# Patient Record
Sex: FEMALE | Race: WHITE | NOT HISPANIC OR LATINO | Employment: OTHER | URBAN - METROPOLITAN AREA
[De-identification: names, ages, dates, MRNs, and addresses within clinical notes are randomized per-mention and may not be internally consistent; named-entity substitution may affect disease eponyms.]

---

## 2017-01-23 ENCOUNTER — HISTORICAL (OUTPATIENT)
Dept: ENDOSCOPY | Facility: HOSPITAL | Age: 57
End: 2017-01-23

## 2017-09-25 ENCOUNTER — HISTORICAL (OUTPATIENT)
Dept: ADMINISTRATIVE | Facility: HOSPITAL | Age: 57
End: 2017-09-25

## 2017-11-21 ENCOUNTER — HOSPITAL ENCOUNTER (OUTPATIENT)
Dept: MEDSURG UNIT | Facility: HOSPITAL | Age: 57
End: 2017-11-22
Attending: INTERNAL MEDICINE | Admitting: INTERNAL MEDICINE

## 2017-11-21 LAB
ABS NEUT (OLG): 3.9 X10(3)/MCL (ref 2.1–9.2)
BASOPHILS # BLD AUTO: 0.1 X10(3)/MCL (ref 0–0.2)
BASOPHILS NFR BLD AUTO: 1 %
EOSINOPHIL # BLD AUTO: 0.4 X10(3)/MCL (ref 0–0.9)
EOSINOPHIL NFR BLD AUTO: 6 %
ERYTHROCYTE [DISTWIDTH] IN BLOOD BY AUTOMATED COUNT: 13.5 % (ref 11.5–17)
HCT VFR BLD AUTO: 33.4 % (ref 37–47)
HGB BLD-MCNC: 10.9 GM/DL (ref 12–16)
LYMPHOCYTES # BLD AUTO: 1.3 X10(3)/MCL (ref 0.6–4.6)
LYMPHOCYTES NFR BLD AUTO: 22 %
MCH RBC QN AUTO: 32.2 PG (ref 27–31)
MCHC RBC AUTO-ENTMCNC: 32.6 GM/DL (ref 33–36)
MCV RBC AUTO: 98.8 FL (ref 80–94)
MONOCYTES # BLD AUTO: 0.3 X10(3)/MCL (ref 0.1–1.3)
MONOCYTES NFR BLD AUTO: 5 %
NEUTROPHILS # BLD AUTO: 3.9 X10(3)/MCL (ref 1.4–7.9)
NEUTROPHILS NFR BLD AUTO: 65 %
PLATELET # BLD AUTO: 227 X10(3)/MCL (ref 130–400)
PMV BLD AUTO: 9.6 FL (ref 9.4–12.4)
RBC # BLD AUTO: 3.38 X10(6)/MCL (ref 4.2–5.4)
WBC # SPEC AUTO: 6 X10(3)/MCL (ref 4.5–11.5)

## 2017-11-22 LAB
ERYTHROCYTE [DISTWIDTH] IN BLOOD BY AUTOMATED COUNT: 13.4 % (ref 11.5–17)
HCT VFR BLD AUTO: 32.4 % (ref 37–47)
HGB BLD-MCNC: 10.4 GM/DL (ref 12–16)
MCH RBC QN AUTO: 31.7 PG (ref 27–31)
MCHC RBC AUTO-ENTMCNC: 32.1 GM/DL (ref 33–36)
MCV RBC AUTO: 98.8 FL (ref 80–94)
PLATELET # BLD AUTO: 234 X10(3)/MCL (ref 130–400)
PMV BLD AUTO: 9.8 FL (ref 9.4–12.4)
RBC # BLD AUTO: 3.28 X10(6)/MCL (ref 4.2–5.4)
WBC # SPEC AUTO: 6.4 X10(3)/MCL (ref 4.5–11.5)

## 2018-01-22 ENCOUNTER — HISTORICAL (OUTPATIENT)
Dept: ADMINISTRATIVE | Facility: HOSPITAL | Age: 58
End: 2018-01-22

## 2018-03-08 ENCOUNTER — HISTORICAL (OUTPATIENT)
Dept: RADIOLOGY | Facility: HOSPITAL | Age: 58
End: 2018-03-08

## 2018-04-16 ENCOUNTER — HISTORICAL (OUTPATIENT)
Dept: ADMINISTRATIVE | Facility: HOSPITAL | Age: 58
End: 2018-04-16

## 2018-04-27 DIAGNOSIS — I65.23 CAROTID STENOSIS, BILATERAL: Primary | ICD-10-CM

## 2018-05-07 DIAGNOSIS — I65.23 CAROTID STENOSIS, BILATERAL: Primary | ICD-10-CM

## 2018-05-15 DIAGNOSIS — I65.23 CAROTID STENOSIS, BILATERAL: Primary | ICD-10-CM

## 2018-05-16 ENCOUNTER — HOSPITAL ENCOUNTER (INPATIENT)
Facility: HOSPITAL | Age: 58
LOS: 1 days | Discharge: HOME | DRG: 641 | End: 2018-05-17
Attending: EMERGENCY MEDICINE | Admitting: INTERNAL MEDICINE
Payer: COMMERCIAL

## 2018-05-16 ENCOUNTER — SURGERY (OUTPATIENT)
Age: 58
End: 2018-05-16

## 2018-05-16 DIAGNOSIS — K31.84 GASTROPARESIS: ICD-10-CM

## 2018-05-16 DIAGNOSIS — E87.6 ACUTE HYPOKALEMIA: Primary | ICD-10-CM

## 2018-05-16 DIAGNOSIS — E83.42 HYPOMAGNESEMIA: ICD-10-CM

## 2018-05-16 DIAGNOSIS — E87.6 CHRONIC HYPOKALEMIA: ICD-10-CM

## 2018-05-16 DIAGNOSIS — I10 ESSENTIAL HYPERTENSION: ICD-10-CM

## 2018-05-16 DIAGNOSIS — E87.8 ELECTROLYTE IMBALANCE: ICD-10-CM

## 2018-05-16 DIAGNOSIS — D64.9 ANEMIA, UNSPECIFIED TYPE: ICD-10-CM

## 2018-05-16 PROBLEM — Z86.73 HISTORY OF STROKE: Status: ACTIVE | Noted: 2018-05-16

## 2018-05-16 PROBLEM — I65.23 CAROTID STENOSIS, BILATERAL: Status: ACTIVE | Noted: 2018-05-16

## 2018-05-16 PROBLEM — G43.909 MIGRAINES: Status: ACTIVE | Noted: 2018-05-16

## 2018-05-16 LAB — MAGNESIUM SERPL-MCNC: 1.3 MG/DL

## 2018-05-16 PROCEDURE — 25000003 PHARM REV CODE 250: Performed by: EMERGENCY MEDICINE

## 2018-05-16 PROCEDURE — 96367 TX/PROPH/DG ADDL SEQ IV INF: CPT

## 2018-05-16 PROCEDURE — 99291 CRITICAL CARE FIRST HOUR: CPT | Mod: ,,, | Performed by: EMERGENCY MEDICINE

## 2018-05-16 PROCEDURE — 99285 EMERGENCY DEPT VISIT HI MDM: CPT | Mod: 25

## 2018-05-16 PROCEDURE — 96365 THER/PROPH/DIAG IV INF INIT: CPT

## 2018-05-16 PROCEDURE — 63600175 PHARM REV CODE 636 W HCPCS: Performed by: EMERGENCY MEDICINE

## 2018-05-16 PROCEDURE — 94761 N-INVAS EAR/PLS OXIMETRY MLT: CPT

## 2018-05-16 PROCEDURE — 25000003 PHARM REV CODE 250: Performed by: INTERNAL MEDICINE

## 2018-05-16 PROCEDURE — 63600175 PHARM REV CODE 636 W HCPCS: Performed by: INTERNAL MEDICINE

## 2018-05-16 PROCEDURE — 99222 1ST HOSP IP/OBS MODERATE 55: CPT | Mod: ,,, | Performed by: INTERNAL MEDICINE

## 2018-05-16 PROCEDURE — 20600001 HC STEP DOWN PRIVATE ROOM

## 2018-05-16 PROCEDURE — 96366 THER/PROPH/DIAG IV INF ADDON: CPT

## 2018-05-16 PROCEDURE — 83735 ASSAY OF MAGNESIUM: CPT

## 2018-05-16 RX ORDER — POTASSIUM CHLORIDE 7.45 MG/ML
10 INJECTION INTRAVENOUS
Status: COMPLETED | OUTPATIENT
Start: 2018-05-16 | End: 2018-05-16

## 2018-05-16 RX ORDER — CARVEDILOL 6.25 MG/1
25 TABLET ORAL 2 TIMES DAILY
Status: DISCONTINUED | OUTPATIENT
Start: 2018-05-16 | End: 2018-05-17 | Stop reason: HOSPADM

## 2018-05-16 RX ORDER — ASPIRIN 325 MG
325 TABLET ORAL DAILY
Status: DISCONTINUED | OUTPATIENT
Start: 2018-05-18 | End: 2018-05-17 | Stop reason: HOSPADM

## 2018-05-16 RX ORDER — ACETAMINOPHEN 500 MG
500 TABLET ORAL EVERY 6 HOURS PRN
Status: DISCONTINUED | OUTPATIENT
Start: 2018-05-16 | End: 2018-05-17 | Stop reason: HOSPADM

## 2018-05-16 RX ORDER — SODIUM CHLORIDE 0.9 % (FLUSH) 0.9 %
5 SYRINGE (ML) INJECTION
Status: DISCONTINUED | OUTPATIENT
Start: 2018-05-16 | End: 2018-05-17 | Stop reason: HOSPADM

## 2018-05-16 RX ORDER — LANOLIN ALCOHOL/MO/W.PET/CERES
400 CREAM (GRAM) TOPICAL 2 TIMES DAILY
Status: DISCONTINUED | OUTPATIENT
Start: 2018-05-16 | End: 2018-05-17 | Stop reason: HOSPADM

## 2018-05-16 RX ORDER — ASPIRIN 325 MG
325 TABLET ORAL DAILY
Status: DISCONTINUED | OUTPATIENT
Start: 2018-05-17 | End: 2018-05-16

## 2018-05-16 RX ORDER — ATORVASTATIN CALCIUM 20 MG/1
20 TABLET, FILM COATED ORAL DAILY
Status: DISCONTINUED | OUTPATIENT
Start: 2018-05-17 | End: 2018-05-17 | Stop reason: HOSPADM

## 2018-05-16 RX ORDER — AMLODIPINE BESYLATE 5 MG/1
5 TABLET ORAL DAILY
Status: DISCONTINUED | OUTPATIENT
Start: 2018-05-17 | End: 2018-05-17 | Stop reason: HOSPADM

## 2018-05-16 RX ORDER — ONDANSETRON 8 MG/1
8 TABLET, ORALLY DISINTEGRATING ORAL EVERY 8 HOURS PRN
Status: DISCONTINUED | OUTPATIENT
Start: 2018-05-16 | End: 2018-05-17 | Stop reason: HOSPADM

## 2018-05-16 RX ORDER — CLOPIDOGREL BISULFATE 75 MG/1
75 TABLET ORAL DAILY
Status: DISCONTINUED | OUTPATIENT
Start: 2018-05-17 | End: 2018-05-16

## 2018-05-16 RX ORDER — POTASSIUM CHLORIDE 20 MEQ/15ML
40 SOLUTION ORAL
Status: COMPLETED | OUTPATIENT
Start: 2018-05-16 | End: 2018-05-16

## 2018-05-16 RX ORDER — MAGNESIUM SULFATE HEPTAHYDRATE 40 MG/ML
2 INJECTION, SOLUTION INTRAVENOUS ONCE
Status: COMPLETED | OUTPATIENT
Start: 2018-05-16 | End: 2018-05-17

## 2018-05-16 RX ORDER — POTASSIUM CHLORIDE 20 MEQ/1
40 TABLET, EXTENDED RELEASE ORAL
Status: COMPLETED | OUTPATIENT
Start: 2018-05-16 | End: 2018-05-16

## 2018-05-16 RX ORDER — SUMATRIPTAN SUCCINATE 100 MG/1
100 TABLET ORAL
COMMUNITY

## 2018-05-16 RX ORDER — CLOPIDOGREL BISULFATE 75 MG/1
75 TABLET ORAL DAILY
Status: DISCONTINUED | OUTPATIENT
Start: 2018-05-18 | End: 2018-05-17 | Stop reason: HOSPADM

## 2018-05-16 RX ORDER — ALPRAZOLAM 0.25 MG/1
0.5 TABLET ORAL 2 TIMES DAILY PRN
Status: DISCONTINUED | OUTPATIENT
Start: 2018-05-16 | End: 2018-05-17 | Stop reason: HOSPADM

## 2018-05-16 RX ORDER — MAGNESIUM SULFATE HEPTAHYDRATE 40 MG/ML
2 INJECTION, SOLUTION INTRAVENOUS
Status: DISPENSED | OUTPATIENT
Start: 2018-05-16 | End: 2018-05-16

## 2018-05-16 RX ORDER — TRAMADOL HYDROCHLORIDE 50 MG/1
50 TABLET ORAL ONCE AS NEEDED
Status: COMPLETED | OUTPATIENT
Start: 2018-05-17 | End: 2018-05-17

## 2018-05-16 RX ORDER — MAGNESIUM SULFATE HEPTAHYDRATE 40 MG/ML
2 INJECTION, SOLUTION INTRAVENOUS
Status: COMPLETED | OUTPATIENT
Start: 2018-05-16 | End: 2018-05-16

## 2018-05-16 RX ADMIN — MAGNESIUM SULFATE IN WATER 2 G: 40 INJECTION, SOLUTION INTRAVENOUS at 09:05

## 2018-05-16 RX ADMIN — POTASSIUM CHLORIDE 40 MEQ: 1500 TABLET, EXTENDED RELEASE ORAL at 09:05

## 2018-05-16 RX ADMIN — Medication 400 MG: at 11:05

## 2018-05-16 RX ADMIN — POTASSIUM CHLORIDE 40 MEQ: 20 SOLUTION ORAL at 01:05

## 2018-05-16 RX ADMIN — POTASSIUM CHLORIDE 40 MEQ: 1500 TABLET, EXTENDED RELEASE ORAL at 11:05

## 2018-05-16 RX ADMIN — MAGNESIUM SULFATE IN WATER 2 G: 40 INJECTION, SOLUTION INTRAVENOUS at 02:05

## 2018-05-16 RX ADMIN — MAGNESIUM SULFATE IN WATER 2 G: 40 INJECTION, SOLUTION INTRAVENOUS at 11:05

## 2018-05-16 RX ADMIN — POTASSIUM CHLORIDE 10 MEQ: 7.46 INJECTION, SOLUTION INTRAVENOUS at 05:05

## 2018-05-16 RX ADMIN — POTASSIUM CHLORIDE 40 MEQ: 1500 TABLET, EXTENDED RELEASE ORAL at 06:05

## 2018-05-16 NOTE — ED PROVIDER NOTES
Encounter Date: 5/16/2018    SCRIBE #1 NOTE: I, Gogo Oliveira, am scribing for, and in the presence of,  Dr. Estrada. I have scribed the entire note.       History     Chief Complaint   Patient presents with    Abnormal Lab     Pt sent down from [facility] for hypokalemia 2.2.  Pt was scheduled to have cerebral angiogram today.      Time patient was seen by the provider: 12:48 PM      The patient is a 57 y.o. female with co-morbidities including: stroke and  HTN who was sent to the ED with a complaint of abnormal labs. The patient was sent from [facility] for hypokalemia (2.2). The patient was scheduled to have an angiogram today. Patient denies any abdominal pain.       The history is provided by the patient and medical records.     Review of patient's allergies indicates:   Allergen Reactions    Morphine Anaphylaxis    Penicillins Shortness Of Breath and Swelling     Past Medical History:   Diagnosis Date    Encounter for blood transfusion     Hypertension     Migraine headache     PONV (postoperative nausea and vomiting)     Stroke     Transfusion reaction      Past Surgical History:   Procedure Laterality Date    APPENDECTOMY      CARDIAC SURGERY      HERNIA REPAIR      HYSTERECTOMY      JOINT REPLACEMENT      VASCULAR SURGERY       History reviewed. No pertinent family history.  Social History   Substance Use Topics    Smoking status: Never Smoker    Smokeless tobacco: Not on file    Alcohol use No     Review of Systems   Constitutional: Negative for activity change and fever.   HENT: Negative for congestion and sore throat.    Eyes: Negative for visual disturbance.   Respiratory: Negative for chest tightness and shortness of breath.    Cardiovascular: Negative for chest pain.   Gastrointestinal: Negative for abdominal distention, abdominal pain, constipation and nausea.   Genitourinary: Negative for difficulty urinating, dysuria, frequency and urgency.   Musculoskeletal: Negative for back pain.   Skin:  Negative for rash.   Neurological: Negative for dizziness, weakness, numbness and headaches.   Hematological: Does not bruise/bleed easily.   Psychiatric/Behavioral: Negative for agitation, behavioral problems, confusion and decreased concentration.   All other systems reviewed and are negative.      Physical Exam     Initial Vitals [05/16/18 1214]   BP Pulse Resp Temp SpO2   (!) 162/78 (!) 59 16 97.6 °F (36.4 °C) 99 %      MAP       106         Physical Exam    Nursing note and vitals reviewed.  Constitutional: She appears well-developed and well-nourished. No distress.   HENT:   Head: Normocephalic and atraumatic.   Nose: Nose normal.   Mouth/Throat: Oropharynx is clear and moist.   Eyes: Conjunctivae and EOM are normal. Pupils are equal, round, and reactive to light.   Neck: Normal range of motion. Neck supple. No JVD present.   Cardiovascular: Normal rate, regular rhythm, normal heart sounds and intact distal pulses.   Pulmonary/Chest: Breath sounds normal. No respiratory distress. She has no wheezes. She has no rhonchi. She has no rales.   Abdominal: Soft. Bowel sounds are normal. She exhibits no distension. There is no tenderness. There is no rebound and no guarding.   Musculoskeletal: Normal range of motion. She exhibits no edema or tenderness.   Neurological: She is alert and oriented to person, place, and time. She has normal strength. No cranial nerve deficit or sensory deficit.   Skin: Skin is warm and dry. Capillary refill takes less than 2 seconds.   Psychiatric: She has a normal mood and affect. Her behavior is normal. Judgment and thought content normal.         ED Course   Procedures  Labs Reviewed   MAGNESIUM - Abnormal; Notable for the following:        Result Value    Magnesium 1.3 (*)     All other components within normal limits             Medical Decision Making:   History:   Old Medical Records: I decided to obtain old medical records.  Differential Diagnosis:   Hypokalemia.  Electrolyte  Imbalance.  Clinical Tests:   Lab Tests: Ordered  Other:   I have discussed this case with another health care provider.       <> Summary of the Discussion: Patient will be admitted to the hospital by Dr Leighton Charles for further evaluation and management.            Scribe Attestation:   Scribe #1: I performed the above scribed service and the documentation accurately describes the services I performed. I attest to the accuracy of the note.    Attending Attestation:         Attending Critical Care:   Critical Care Times:   Direct Patient Care (initial evaluation, reassessments, and time considering the case)................................................................10 minutes.   Additional History from reviewing old medical records or taking additional history from the family, EMS, PCP, etc.......................5 minutes.   Ordering, Reviewing, and Interpreting Diagnostic Studies...............................................................................................................10 minutes.   Documentation..................................................................................................................................................................................10 minutes.   Consultation with other Physicians. .................................................................................................................................................10 minutes.   ==============================================================  · Total Critical Care Time - exclusive of procedural time: 45 minutes.  ==============================================================  Critical care was necessary to treat or prevent imminent or life-threatening deterioration of the following conditions: metabolic crisis.   Critical care was time spent personally by me on the following activities: obtaining history from patient or relative, examination of patient, ordering lab, x-rays, and/or EKG,  development of treatment plan with patient or relative, ordering and performing treatments and interventions, evaluation of patient's response to treatment, discussion with consultants and re-evaluation of patient's conition.   Critical Care Condition: potentially life-threatening     Physician Attestation for Dori:      Comments: I, Dr. Estrada, personally performed the services described in this documentation. All medical record entries made by the scribe were at my direction and in my presence.  I have reviewed the chart and agree that the record reflects my personal performance and is accurate and complete.                 Clinical Impression:   The primary encounter diagnosis was Acute hypokalemia. Diagnoses of Hypomagnesemia and Electrolyte imbalance were also pertinent to this visit.    Disposition:   Disposition: Admitted  Condition: Stable                        Shannan Estrada MD  05/16/18 1927

## 2018-05-16 NOTE — ED NOTES
Pt presents with hypokalemia of 2.2. Pt reports migraine, muscle aches, CP and palpitations. Pt also states she had a large bruise to left groin and thought it was a blood clot but had a u/s on Friday and it was negative. Pt recently received IV K and was supposed to have a cerebral angiogram today.

## 2018-05-16 NOTE — H&P
"Hospital Medicine  History and Physical Exam    Patient Name; Radha Huang  MRN: [id number]  [place] [facility] [place] [facility] [place] Kandi Charles MD  Admit Date: 5/16/2018    TANNER: 5/18/2018  Principal Problem:  Chronic hypokalemia   Patient information was obtained from patient, past medical records and ER records.   Primary care Physician: Primary Doctor No  Code status: Full Code    HPI: 57 y.o. female presented to the ER from [facility] area with past medical history of possible carotid artery stenosis, "mini-strokes," hypertension, and anemia.  She was in her usual state of fair health until the recurrent onset of severe hypokalemia beginning a few weeks prior to admission with gradually worsening course. Pertinent associated symptoms include weakness and fatigue. She has had several episodes of hypokalemia as OP requiring IV repletion over the past few weeks. She is not on chronic K+ or Mg++ supplementation.    Past Medical History: Patient has a past medical history of Encounter for blood transfusion; Hypertension; Migraine headache; PONV (postoperative nausea and vomiting); Stroke; and Transfusion reaction.    Past Surgical History: Patient has a past surgical history that includes Hysterectomy; Hernia repair; Cardiac surgery; Vascular surgery; Joint replacement; and Appendectomy.    Social History: Patient reports that she has never smoked. She does not have any smokeless tobacco history on file. She reports that she does not drink alcohol or use drugs.    Family History: family history includes Hypokalemia in her mother.    Medications:   Current Facility-Administered Medications on File Prior to Encounter   Medication Dose Route Frequency Provider Last Rate Last Dose    [COMPLETED] 0.9%  NaCl infusion  500 mL Intravenous Once Laura Myers NP   Stopped at 05/16/18 1158    [DISCONTINUED] potassium chloride 10 mEq in 100 mL IVPB  10 mEq Intravenous Once Miguel Soriano MD        [DISCONTINUED] potassium " chloride 10 mEq in 100 mL IVPB  10 mEq Intravenous Q1H Miguel Soriano MD        [DISCONTINUED] potassium chloride CR capsule 10 mEq  10 mEq Oral Once Miguel Soriano MD         Current Outpatient Prescriptions on File Prior to Encounter   Medication Sig Dispense Refill    ALPRAZolam (XANAX) 0.5 MG tablet Take 0.5 mg by mouth 2 (two) times daily as needed for Anxiety.      amLODIPine (NORVASC) 5 MG tablet Take 5 mg by mouth once daily.      aspirin 325 MG tablet Take 325 mg by mouth once daily.      atorvastatin (LIPITOR) 20 MG tablet Take 20 mg by mouth once daily.      carvedilol (COREG) 25 MG tablet Take 25 mg by mouth 2 (two) times daily.      clopidogrel (PLAVIX) 75 mg tablet Take 75 mg by mouth once daily.      nitroGLYCERIN (NITROSTAT) 0.3 MG SL tablet Place 0.3 mg under the tongue every 5 (five) minutes as needed for Chest pain.      oxyCODONE-acetaminophen (PERCOCET)  mg per tablet Take 1 tablet by mouth every 4 (four) hours as needed for Pain.      [DISCONTINUED] potassium chloride (MICRO-K) 10 MEQ CpSR Take 10 mEq by mouth once.         Allergies: Patient is allergic to morphine and penicillins.    Review of Systems   Constitutional: Positive for malaise/fatigue. Negative for fever.   HENT: Negative.    Eyes: Negative.    Respiratory: Negative.    Cardiovascular: Negative.    Gastrointestinal: Negative.    Genitourinary: Negative.    Musculoskeletal: Negative.    Skin: Negative.    Neurological: Negative.    Endo/Heme/Allergies: Bruises/bleeds easily.       Physical Exam:  Temp:  [97.6 °F (36.4 °C)-98.2 °F (36.8 °C)]   Pulse:  [59]   Resp:  [16]   BP: (162-174)/(78-83)   SpO2:  [99 %]   Body mass index is 19.79 kg/m².     Physical Exam   Constitutional:  Non-toxic appearance. No distress.   HENT:   Head: Normocephalic.   Mouth/Throat: Mucous membranes are not pale and not cyanotic.   Eyes: Conjunctivae and lids are normal. Pupils are equal.   Neck: Neck supple.   Cardiovascular:  Regular rhythm, S1 normal and S2 normal.    Pulmonary/Chest: Effort normal and breath sounds normal.   Abdominal: Soft. Normal appearance and bowel sounds are normal. There is no tenderness.   Musculoskeletal: She exhibits no edema.   Neurological: She is alert. She is not disoriented.   Skin: Skin is warm and dry. Bruising (L LE) noted. No cyanosis.   Psychiatric: Mood and affect normal.         Intake/Output Summary (Last 24 hours) at 05/16/18 1739  Last data filed at 05/16/18 1701   Gross per 24 hour   Intake               50 ml   Output                0 ml   Net               50 ml       Significant Labs and Imaging:      Recent Labs  Lab 05/16/18  0850   WBC 6.35   HGB 10.1*   HCT 30.3*          Recent Labs  Lab 05/16/18  0850 05/16/18  1035 05/16/18  1312    145  --    K 2.5* 2.2*  --    CL 92* 93*  --    CO2 42* 44*  --    BUN 13 13  --    CREATININE 1.1 1.2  --    GLU 89 87  --    CALCIUM 9.5 9.4  --    MG  --   --  1.3*   ALKPHOS 63  --   --    ALT 7*  --   --    AST 19  --   --    ALBUMIN 3.6  --   --    PROT 6.3  --   --    BILITOT 0.2  --   --    INR 1.1  --   --      Baselines:  Hemoglobin   Date Value Ref Range Status   05/16/2018 10.1 (L) 12.0 - 16.0 g/dL Final     Creatinine   Date Value Ref Range Status   05/16/2018 1.2 0.5 - 1.4 mg/dL Final   05/16/2018 1.1 0.5 - 1.4 mg/dL Final       Inpatient Medications prescribed for management of current Problems:   Scheduled Meds:    [START ON 5/17/2018] amLODIPine  5 mg Oral Daily    [START ON 5/18/2018] aspirin  325 mg Oral Daily    [START ON 5/17/2018] atorvastatin  20 mg Oral Daily    carvedilol  25 mg Oral BID    [START ON 5/18/2018] clopidogrel  75 mg Oral Daily    magnesium oxide  400 mg Oral BID    magnesium sulfate IVPB  2 g Intravenous Q2H    potassium chloride  40 mEq Oral Q2H     Continuous Infusions:   As Needed: acetaminophen, ALPRAZolam, ondansetron, sodium chloride 0.9%    Active Hospital Problems    Diagnosis  POA     "*Chronic hypokalemia [E87.6]  Yes    Carotid stenosis, bilateral [I65.23]  Yes    Essential hypertension [I10]  Yes    History of stroke [Z86.73]  Not Applicable    Migraines [G43.909]  Yes    Anemia [D64.9]  Yes      Resolved Hospital Problems    Diagnosis Date Resolved POA   No resolved problems to display.       Overview:  Patient is a 57 y.o. female with significant past medical history of "mini-strokes" awaiting cerebral angiogram admitted to hospital for hypokalemia.      Assessment and Plan for Problems addressed today:     Acute on Chronic Hypokalemia and Hypomagnesemia  Repletion PO and IV.     Carotid stenosis, bilateral, history of strokes  Here for cerebral angiogram. Consulting IR to reschedule for 05/17/18. NPO after MN.  Holding ASA and Plavix.     Essential hypertension  Continuing home medications.     Migraines  Currently stable.     Anemia  Unclear etiology; sees non-[facility] Heme/Onc. Reports multiple transfusions and antibody formation.      DVT Prophylaxis:   Anticoagulants   Medication Route Frequency       Discharge plan and follow up  Home or Self Care      Provider  Kandi Charles MD  Department of Hospital Medicine      "

## 2018-05-17 VITALS
HEIGHT: 59 IN | OXYGEN SATURATION: 96 % | RESPIRATION RATE: 16 BRPM | TEMPERATURE: 98 F | BODY MASS INDEX: 20.8 KG/M2 | SYSTOLIC BLOOD PRESSURE: 131 MMHG | WEIGHT: 103.19 LBS | DIASTOLIC BLOOD PRESSURE: 77 MMHG | HEART RATE: 60 BPM

## 2018-05-17 PROBLEM — K31.84 GASTROPARESIS: Status: ACTIVE | Noted: 2018-05-17

## 2018-05-17 LAB
ALBUMIN SERPL BCP-MCNC: 3.2 G/DL
ANION GAP SERPL CALC-SCNC: 3 MMOL/L
BASOPHILS # BLD AUTO: 0.05 K/UL
BASOPHILS NFR BLD: 0.8 %
BUN SERPL-MCNC: 11 MG/DL
CALCIUM SERPL-MCNC: 7.4 MG/DL
CHLORIDE SERPL-SCNC: 107 MMOL/L
CO2 SERPL-SCNC: 30 MMOL/L
CREAT SERPL-MCNC: 1 MG/DL
DIFFERENTIAL METHOD: ABNORMAL
EOSINOPHIL # BLD AUTO: 0.7 K/UL
EOSINOPHIL NFR BLD: 10.4 %
ERYTHROCYTE [DISTWIDTH] IN BLOOD BY AUTOMATED COUNT: 13.5 %
EST. GFR  (AFRICAN AMERICAN): >60 ML/MIN/1.73 M^2
EST. GFR  (NON AFRICAN AMERICAN): >60 ML/MIN/1.73 M^2
GLUCOSE SERPL-MCNC: 96 MG/DL
HCT VFR BLD AUTO: 29.2 %
HGB BLD-MCNC: 9.5 G/DL
IMM GRANULOCYTES # BLD AUTO: 0.01 K/UL
IMM GRANULOCYTES NFR BLD AUTO: 0.2 %
LYMPHOCYTES # BLD AUTO: 1.8 K/UL
LYMPHOCYTES NFR BLD: 28.6 %
MAGNESIUM SERPL-MCNC: 2.9 MG/DL
MCH RBC QN AUTO: 32.2 PG
MCHC RBC AUTO-ENTMCNC: 32.5 G/DL
MCV RBC AUTO: 99 FL
MONOCYTES # BLD AUTO: 0.4 K/UL
MONOCYTES NFR BLD: 6.1 %
NEUTROPHILS # BLD AUTO: 3.4 K/UL
NEUTROPHILS NFR BLD: 53.9 %
NRBC BLD-RTO: 0 /100 WBC
PHOSPHATE SERPL-MCNC: 1.5 MG/DL
PLATELET # BLD AUTO: 192 K/UL
PMV BLD AUTO: 10.9 FL
POTASSIUM SERPL-SCNC: 4 MMOL/L
RBC # BLD AUTO: 2.95 M/UL
SODIUM SERPL-SCNC: 140 MMOL/L
WBC # BLD AUTO: 6.36 K/UL

## 2018-05-17 PROCEDURE — B31CYZZ FLUOROSCOPY OF BILATERAL EXTERNAL CAROTID ARTERIES USING OTHER CONTRAST: ICD-10-PCS | Performed by: RADIOLOGY

## 2018-05-17 PROCEDURE — B31GYZZ FLUOROSCOPY OF BILATERAL VERTEBRAL ARTERIES USING OTHER CONTRAST: ICD-10-PCS | Performed by: RADIOLOGY

## 2018-05-17 PROCEDURE — 63600175 PHARM REV CODE 636 W HCPCS: Performed by: RADIOLOGY

## 2018-05-17 PROCEDURE — 36415 COLL VENOUS BLD VENIPUNCTURE: CPT

## 2018-05-17 PROCEDURE — 25500020 PHARM REV CODE 255: Performed by: INTERNAL MEDICINE

## 2018-05-17 PROCEDURE — 99239 HOSP IP/OBS DSCHRG MGMT >30: CPT | Mod: ,,, | Performed by: INTERNAL MEDICINE

## 2018-05-17 PROCEDURE — 80069 RENAL FUNCTION PANEL: CPT

## 2018-05-17 PROCEDURE — 25000003 PHARM REV CODE 250: Performed by: PHYSICIAN ASSISTANT

## 2018-05-17 PROCEDURE — 25000003 PHARM REV CODE 250: Performed by: INTERNAL MEDICINE

## 2018-05-17 PROCEDURE — B315YZZ FLUOROSCOPY OF BILATERAL COMMON CAROTID ARTERIES USING OTHER CONTRAST: ICD-10-PCS | Performed by: RADIOLOGY

## 2018-05-17 PROCEDURE — 83735 ASSAY OF MAGNESIUM: CPT

## 2018-05-17 PROCEDURE — 85025 COMPLETE CBC W/AUTO DIFF WBC: CPT

## 2018-05-17 PROCEDURE — B318YZZ FLUOROSCOPY OF BILATERAL INTERNAL CAROTID ARTERIES USING OTHER CONTRAST: ICD-10-PCS | Performed by: RADIOLOGY

## 2018-05-17 RX ORDER — MIDAZOLAM HYDROCHLORIDE 1 MG/ML
INJECTION INTRAMUSCULAR; INTRAVENOUS CODE/TRAUMA/SEDATION MEDICATION
Status: COMPLETED | OUTPATIENT
Start: 2018-05-17 | End: 2018-05-17

## 2018-05-17 RX ORDER — IODIXANOL 320 MG/ML
200 INJECTION, SOLUTION INTRAVASCULAR
Status: COMPLETED | OUTPATIENT
Start: 2018-05-17 | End: 2018-05-17

## 2018-05-17 RX ORDER — LANOLIN ALCOHOL/MO/W.PET/CERES
400 CREAM (GRAM) TOPICAL 2 TIMES DAILY
Refills: 0 | COMMUNITY
Start: 2018-05-17

## 2018-05-17 RX ORDER — BUTALBITAL, ACETAMINOPHEN AND CAFFEINE 50; 325; 40 MG/1; MG/1; MG/1
1 TABLET ORAL EVERY 4 HOURS PRN
Status: DISCONTINUED | OUTPATIENT
Start: 2018-05-17 | End: 2018-05-17 | Stop reason: HOSPADM

## 2018-05-17 RX ORDER — OXYCODONE AND ACETAMINOPHEN 10; 325 MG/1; MG/1
1 TABLET ORAL EVERY 6 HOURS PRN
Status: DISCONTINUED | OUTPATIENT
Start: 2018-05-17 | End: 2018-05-17 | Stop reason: HOSPADM

## 2018-05-17 RX ORDER — FENTANYL CITRATE 50 UG/ML
INJECTION, SOLUTION INTRAMUSCULAR; INTRAVENOUS CODE/TRAUMA/SEDATION MEDICATION
Status: COMPLETED | OUTPATIENT
Start: 2018-05-17 | End: 2018-05-17

## 2018-05-17 RX ORDER — POTASSIUM CHLORIDE 750 MG/1
10 CAPSULE, EXTENDED RELEASE ORAL DAILY
Qty: 30 CAPSULE | Refills: 1 | COMMUNITY
Start: 2018-05-17

## 2018-05-17 RX ADMIN — ATORVASTATIN CALCIUM 20 MG: 20 TABLET, FILM COATED ORAL at 08:05

## 2018-05-17 RX ADMIN — IODIXANOL 60 ML: 320 INJECTION, SOLUTION INTRAVASCULAR at 03:05

## 2018-05-17 RX ADMIN — MIDAZOLAM HYDROCHLORIDE 1 MG: 1 INJECTION, SOLUTION INTRAMUSCULAR; INTRAVENOUS at 03:05

## 2018-05-17 RX ADMIN — TRAMADOL HYDROCHLORIDE 50 MG: 50 TABLET, FILM COATED ORAL at 12:05

## 2018-05-17 RX ADMIN — FENTANYL CITRATE 25 MCG: 50 INJECTION, SOLUTION INTRAMUSCULAR; INTRAVENOUS at 03:05

## 2018-05-17 RX ADMIN — DIBASIC SODIUM PHOSPHATE, MONOBASIC POTASSIUM PHOSPHATE AND MONOBASIC SODIUM PHOSPHATE 1 TABLET: 852; 155; 130 TABLET ORAL at 12:05

## 2018-05-17 RX ADMIN — Medication 400 MG: at 08:05

## 2018-05-17 RX ADMIN — OXYCODONE HYDROCHLORIDE AND ACETAMINOPHEN 1 TABLET: 10; 325 TABLET ORAL at 10:05

## 2018-05-17 RX ADMIN — ALPRAZOLAM 0.5 MG: 0.25 TABLET ORAL at 12:05

## 2018-05-17 RX ADMIN — DIBASIC SODIUM PHOSPHATE, MONOBASIC POTASSIUM PHOSPHATE AND MONOBASIC SODIUM PHOSPHATE 1 TABLET: 852; 155; 130 TABLET ORAL at 05:05

## 2018-05-17 RX ADMIN — ACETAMINOPHEN 500 MG: 500 TABLET ORAL at 09:05

## 2018-05-17 RX ADMIN — AMLODIPINE BESYLATE 5 MG: 5 TABLET ORAL at 08:05

## 2018-05-17 NOTE — H&P
Radiology Post-Procedure Note    Pre Op Diagnosis: carotid stenosis, TIA    Post Op Diagnosis: same    Procedure: Cerebral angiogram    Procedure performed by: Pop Ocampo MD    Written Informed Consent Obtained: Yes    Specimen Removed: NO    Estimated Blood Loss: Minimal    Procedure report:     A 5F sheath was placed into the right femoral artery and a 5F Samir catheter was advanced into the aortic arch.  The Bilateral ICA, CCA, and vertebral  arteries were subselected and angiography of the brain was performed after injection into each of these vessels.    Preliminary interpretation: no intracranial stenoses.  Please see Imaging report for full details.    A right femoral artery angiogram was performed, the sheath removed and hemostasis achieved using Exoseal.  No hematoma was present at the time of hemostasis.    The patient tolerated the procedure well.     Pop Ocampo MD  Attending Radiologist  Interventional Neuroradiology

## 2018-05-17 NOTE — ED NOTES
Upon departure patient was alert and oriented, respirations even and unlabored, skin dry and warm, and showed no signs or symptoms of acute distress.

## 2018-05-17 NOTE — NURSING
Discharge instructions, follow up appointments, medications reviewed with patient and pt. Spouse. Both verbalizes understanding. PIV removed with catheter tip intact. Wheelchair offered, pt. Left unit via foot with spouse.

## 2018-05-17 NOTE — PROGRESS NOTES
Pt arrived to [facility], bay 3. Report received from JAYLON Lundy. Dressing to groin dry and intact. Family at bedside.

## 2018-05-17 NOTE — PLAN OF CARE
Problem: Patient Care Overview  Goal: Plan of Care Review  Plan of care reviewed with patient and pt. Family. AAOX4, tele monitor on pt, jorge a 55-60. Complaints of migraine, prn tylenol and percocet given. No complaints of nausea. NPO diet maintained. Pt. Ambulates independently, voids in toilet. Call light within reach, will monitor.

## 2018-05-17 NOTE — PLAN OF CARE
Problem: Patient Care Overview  Goal: Plan of Care Review  Outcome: Ongoing (interventions implemented as appropriate)  Patient AAO x4, calm and cooperative. Pt arrived from ED for Hypokalemia.  Magnesium and Potassium replaced per MD orders.  Pt NPO midnight for IR angiogram of carotid. No fall and injuries overnight. SCD's placed for VTE prophylaxis. Pt complaining of pain in neck and lower extremities. Tramadol administered for pain. No relief obtained. Call light within reach. Patient stable will continue to monitor.

## 2018-05-17 NOTE — H&P
Inpatient Radiology Pre-procedure Note    History of Present Illness:  Radha Huang is a 57 y.o. female who presents for cerebral angiogram to evaluate bilateral intracranial ICA stenoses on outside MRA.  Procedure was scheduled for yesterday, but she had severe hypokalemia that was treated overnight.    Admission H&P reviewed.  Past Medical History:   Diagnosis Date    Encounter for blood transfusion     Hypertension     Migraine headache     PONV (postoperative nausea and vomiting)     Stroke     Transfusion reaction      Past Surgical History:   Procedure Laterality Date    APPENDECTOMY      CARDIAC SURGERY      HERNIA REPAIR      HYSTERECTOMY      JOINT REPLACEMENT      VASCULAR SURGERY         Review of Systems:   As documented in primary team H&P    Home Meds:   Prior to Admission medications    Medication Sig Start Date End Date Taking? Authorizing Provider   ALPRAZolam (XANAX) 0.5 MG tablet Take 0.5 mg by mouth 2 (two) times daily as needed for Anxiety.   Yes Historical Provider, MD   amLODIPine (NORVASC) 5 MG tablet Take 5 mg by mouth once daily.   Yes Historical Provider, MD   aspirin 325 MG tablet Take 325 mg by mouth once daily.   Yes Historical Provider, MD   atorvastatin (LIPITOR) 20 MG tablet Take 20 mg by mouth once daily.   Yes Historical Provider, MD   carvedilol (COREG) 25 MG tablet Take 25 mg by mouth 2 (two) times daily.   Yes Historical Provider, MD   clopidogrel (PLAVIX) 75 mg tablet Take 75 mg by mouth once daily.   Yes Historical Provider, MD   nitroGLYCERIN (NITROSTAT) 0.3 MG SL tablet Place 0.3 mg under the tongue every 5 (five) minutes as needed for Chest pain.   Yes Historical Provider, MD   oxyCODONE-acetaminophen (PERCOCET)  mg per tablet Take 1 tablet by mouth every 4 (four) hours as needed for Pain.   Yes Historical Provider, MD   sumatriptan (IMITREX) 100 MG tablet Take 100 mg by mouth every 2 (two) hours as needed for Migraine.   Yes Historical Provider, MD      Scheduled Meds:    amLODIPine  5 mg Oral Daily    [START ON 5/18/2018] aspirin  325 mg Oral Daily    atorvastatin  20 mg Oral Daily    carvedilol  25 mg Oral BID    [START ON 5/18/2018] clopidogrel  75 mg Oral Daily    k phos di & mono-sod phos mono  1 tablet Oral TID WM    magnesium oxide  400 mg Oral BID     Continuous Infusions:   PRN Meds:acetaminophen, ALPRAZolam, ondansetron, sodium chloride 0.9%  Anticoagulants/Antiplatelets: no anticoagulation    Allergies:   Review of patient's allergies indicates:   Allergen Reactions    Morphine Anaphylaxis    Penicillins Shortness Of Breath and Swelling     Sedation Hx: have not been any systemic reactions    Labs:    Recent Labs  Lab 05/16/18 0850   INR 1.1       Recent Labs  Lab 05/17/18 0802   WBC 6.36   HGB 9.5*   HCT 29.2*   MCV 99*         Recent Labs  Lab 05/16/18 0850  05/17/18 0802   GLU 89  < > 96     < > 140   K 2.5*  < > 4.0   CL 92*  < > 107   CO2 42*  < > 30*   BUN 13  < > 11   CREATININE 1.1  < > 1.0   CALCIUM 9.5  < > 7.4*   MG  --   < > 2.9*   ALT 7*  --   --    AST 19  --   --    ALBUMIN 3.6  --  3.2*   BILITOT 0.2  --   --    < > = values in this interval not displayed.      Vitals:  Temp: 97.4 °F (36.3 °C) (05/17/18 0820)  Pulse: (!) 57 (05/17/18 0820)  Resp: 16 (05/17/18 0820)  BP: (!) 156/79 (05/17/18 0820)  SpO2: (!) 93 % (05/17/18 0820)     Physical Exam:  ASA: 2  Mallampati: 2    General: no acute distress  Mental Status: alert and oriented to person, place and time  HEENT: normocephalic, atraumatic  Chest: unlabored breathing  Heart: regular heart rate  Abdomen: nondistended  Extremity: moves all extremities    Plan: Cerebral angiogram, diagnostic  Sedation Plan: autumn Ocampo MD  Attending Radiologist  Interventional Neuroradiology

## 2018-05-17 NOTE — ED NOTES
Put patient on portable continuous telemetry box number [id number], Called telemetry room and notified them of the box number and patient.

## 2018-05-17 NOTE — MEDICAL/APP STUDENT
Hospital Medicine  Progress Note    Patient Name: Radha Huang  MRN: [id number]  Team: [facility] [place] [facility] Kandi Charles MD  Admit Date: 5/16/2018  TANNER 5/18/2018  Code status: Full Code    Principal Problem:  Chronic hypokalemia    Interval history:  IR cerebral angiogram scheduled for today.     Review of Systems   Respiratory: Negative for shortness of breath.    Gastrointestinal: Negative for nausea and vomiting.       Physical Exam:  Temp:  [97.4 °F (36.3 °C)-98.3 °F (36.8 °C)]   Pulse:  [57-67]   Resp:  [16-22]   BP: (113-174)/(63-83)   SpO2:  [93 %-99 %]      Temp: 97.4 °F (36.3 °C) (05/17/18 0820)  Pulse: (!) 57 (05/17/18 0820)  Resp: 16 (05/17/18 0820)  BP: (!) 156/79 (05/17/18 0820)  SpO2: (!) 93 % (05/17/18 0820)    Intake/Output Summary (Last 24 hours) at 05/17/18 0934  Last data filed at 05/16/18 1952   Gross per 24 hour   Intake              150 ml   Output                0 ml   Net              150 ml     Weight: 46.8 kg (103 lb 2.8 oz)  Body mass index is 20.84 kg/m².    Physical Exam   Constitutional: No distress.   Eyes: Conjunctivae and lids are normal.   Cardiovascular: S1 normal and S2 normal.    Pulmonary/Chest: Effort normal and breath sounds normal.   Abdominal: Soft. Bowel sounds are normal. There is no tenderness.   Musculoskeletal: She exhibits no edema.   Neurological: She is not disoriented.       Significant Labs:    Recent Labs  Lab 05/16/18  0850 05/17/18  0802   WBC 6.35 6.36   HGB 10.1* 9.5*   HCT 30.3* 29.2*    192       Recent Labs  Lab 05/16/18  0850 05/16/18  1035 05/16/18  1312 05/17/18  0802    145  --  140   K 2.5* 2.2*  --  4.0   CL 92* 93*  --  107   CO2 42* 44*  --  30*   BUN 13 13  --  11   CREATININE 1.1 1.2  --  1.0   GLU 89 87  --  96   CALCIUM 9.5 9.4  --  7.4*   MG  --   --  1.3* 2.9*   PHOS  --   --   --  1.5*   ALKPHOS 63  --   --   --    ALT 7*  --   --   --    AST 19  --   --   --    ALBUMIN 3.6  --   --  3.2*   PROT 6.3  --   --   --    BILITOT 0.2   "--   --   --    INR 1.1  --   --   --        Inpatient Medications prescribed for management of current Problems:   Scheduled Meds:    amLODIPine  5 mg Oral Daily    [START ON 5/18/2018] aspirin  325 mg Oral Daily    atorvastatin  20 mg Oral Daily    carvedilol  25 mg Oral BID    [START ON 5/18/2018] clopidogrel  75 mg Oral Daily    magnesium oxide  400 mg Oral BID     Continuous Infusions:   As Needed: acetaminophen, ALPRAZolam, ondansetron, sodium chloride 0.9%    Active Hospital Problems    Diagnosis  POA    *Chronic hypokalemia [E87.6]  Yes    Carotid stenosis, bilateral [I65.23]  Yes    Essential hypertension [I10]  Yes    History of stroke [Z86.73]  Not Applicable    Migraines [G43.909]  Yes    Anemia [D64.9]  Yes      Resolved Hospital Problems    Diagnosis Date Resolved POA   No resolved problems to display.       Overview:    Patient is a 57 y.o. female with significant past medical history of "mini-strokes" awaiting cerebral angiogram admitted to hospital for hypokalemia.       Assessment and Plan for Problems addressed today:     Acute on Chronic Hypokalemia and Hypomagnesemia  Repletion PO and IV. Levels improved. Plan to continue outpatient supplementation. Needs close follow up with PCP.       Carotid stenosis, bilateral, history of strokes  Here for cerebral angiogram. Consulted IR to reschedule for 05/17/18. NPO after MN.  Holding ASA and Plavix for procedure. Plan to resume post procedure.       Essential hypertension  Continuing home medications. Morning dose of carvedilol was held for HR of 57.       Migraines  Currently stable.      Anemia  Unclear etiology; sees non-[facility] Heme/Onc. Reports multiple transfusions and antibody formation.    Gastroparesis   Mild Malnutrition   May be contributing to electrolyte abnormalities. Discussed nutritional support. Recommending trial of boost breeze.      DVT Prophylaxis:   Anticoagulants   Medication Route Frequency         Discharge plan and " follow up  Home or Self Care      Provider  Kandi Charles MD  [facility] [place] [facility]   Department of Hospital Medicine      Scribapple Attestation: I personally scribed for Kandi Charles MD on 05/17/2018 at 8:33 AM. Electronically signed by reji Hitchcock on 05/17/2018 at 8:33 AM.

## 2018-05-17 NOTE — PROGRESS NOTES
Procedure complete, pt tolerated well.  Hemostasis achieved at this time, dry sterile drsg applied. Pt to remain flat until 1730. To [facility] for recovery, report will be given at bedside.

## 2018-05-17 NOTE — DISCHARGE SUMMARY
"Discharge Summary  Hospital Medicine    Patient Name: Radha Huang  MRN: [id number]  Attending Provider on Discharge: Kandi Charles MD  [place] Medicine [place] [facility] [place] [facility] [place]  Date of Admission:  5/16/2018     Date of Discharge:  5/17/2018  7:00 PM  Code status: Full Code    Active Hospital Problems    Diagnosis  POA    *Chronic hypokalemia [E87.6]  Yes    Gastroparesis [K31.84]  Yes    Carotid stenosis, bilateral [I65.23]  Yes    Essential hypertension [I10]  Yes    History of stroke [Z86.73]  Not Applicable    Migraines [G43.909]  Yes    Anemia [D64.9]  Yes      Resolved Hospital Problems    Diagnosis Date Resolved POA   No resolved problems to display.        HPI: 57 y.o. female presented to the ER from IR procedure area with past medical history of possible carotid artery stenosis, "mini-strokes," hypertension, and anemia. She was found to have severe hypokalemia that began a few weeks prior to admission. Pertinent associated symptoms included weakness and fatigue. She has had several episodes of hypokalemia as OP requiring IV repletion over the past few weeks. She is not on chronic K+ or Mg++ supplementation. Procedure was postponed.    Hospital Course:  57 y.o. female with significant past medical history of "mini-strokes" awaiting cerebral angiogram admitted to hospital for hypokalemia.      Acute on Chronic Hypokalemia and Hypomagnesemia  Repletion PO and IV. Levels improved. Plan to continue outpatient oral supplementation.   Needs close follow up with PCP.       Carotid stenosis, bilateral, history of strokes  Here for cerebral angiogram. Consulted IR to reschedule for 05/17/18.   Held ASA and Plavix for procedure. Plan to resume post procedure.   Angiogram reportedly unremarkable.      Essential hypertension  Continuing home medications. Morning dose of carvedilol was held for HR of 57.       Migraines  Currently stable.      Anemia  Unclear etiology; sees non-[facility] Heme/Onc. Reports multiple " transfusions and antibody formation.     Gastroparesis   Mild Malnutrition   May be contributing to electrolyte abnormalities. Discussed nutritional support.   Recommending trial of Boost Breeze.           Recent Labs  Lab 05/16/18  0850 05/17/18  0802   WBC 6.35 6.36   HGB 10.1* 9.5*   HCT 30.3* 29.2*    192       Recent Labs  Lab 05/16/18  0850 05/16/18  1035 05/16/18  1312 05/17/18  0802    145  --  140   K 2.5* 2.2*  --  4.0   CL 92* 93*  --  107   CO2 42* 44*  --  30*   BUN 13 13  --  11   CREATININE 1.1 1.2  --  1.0   GLU 89 87  --  96   CALCIUM 9.5 9.4  --  7.4*   MG  --   --  1.3* 2.9*   PHOS  --   --   --  1.5*       Recent Labs  Lab 05/16/18  0850 05/17/18  0802   ALKPHOS 63  --    ALT 7*  --    AST 19  --    ALBUMIN 3.6 3.2*   PROT 6.3  --    BILITOT 0.2  --    INR 1.1  --         Procedures: Cerebral angiogram    Consultants:   Consults         Status Ordering Provider     Inpatient consult to Radiology  Once     Provider:  (Not yet assigned)    Acknowledged NATHAN GARY          Medications:  Reconciled Home Medications:      Medication List      START taking these medications    food supplemt, lactose-reduced 0.04-1.05 gram-kcal/mL Liqd  Commonly known as:  BOOST BREEZE NUTRITIONAL  Take 1 Box by mouth 3 (three) times daily.     magnesium oxide 400 mg tablet  Commonly known as:  MAG-OX  Take 1 tablet (400 mg total) by mouth 2 (two) times daily.        CHANGE how you take these medications    potassium chloride 10 MEQ Cpsr  Commonly known as:  MICRO-K  Take 1 capsule (10 mEq total) by mouth once daily.  What changed:  when to take this        CONTINUE taking these medications    ALPRAZolam 0.5 MG tablet  Commonly known as:  XANAX  Take 0.5 mg by mouth 2 (two) times daily as needed for Anxiety.     amLODIPine 5 MG tablet  Commonly known as:  NORVASC  Take 5 mg by mouth once daily.     aspirin 325 MG tablet  Take 325 mg by mouth once daily.     atorvastatin 20 MG tablet  Commonly known  as:  LIPITOR  Take 20 mg by mouth once daily.     carvedilol 25 MG tablet  Commonly known as:  COREG  Take 25 mg by mouth 2 (two) times daily.     clopidogrel 75 mg tablet  Commonly known as:  PLAVIX  Take 75 mg by mouth once daily.     nitroGLYCERIN 0.3 MG SL tablet  Commonly known as:  NITROSTAT  Place 0.3 mg under the tongue every 5 (five) minutes as needed for Chest pain.     oxyCODONE-acetaminophen  mg per tablet  Commonly known as:  PERCOCET  Take 1 tablet by mouth every 4 (four) hours as needed for Pain.     sumatriptan 100 MG tablet  Commonly known as:  IMITREX  Take 100 mg by mouth every 2 (two) hours as needed for Migraine.            Discharge Instructions:    Discharge Procedure Orders  Diet Adult Regular     Activity as tolerated     Notify your health care provider if you experience any of the following:  temperature >100.4     Notify your health care provider if you experience any of the following:  persistent nausea and vomiting or diarrhea     Notify your health care provider if you experience any of the following:  difficulty breathing or increased cough     Notify your health care provider if you experience any of the following:  persistent dizziness, light-headedness, or visual disturbances     Notify your health care provider if you experience any of the following:  increased confusion or weakness         Discharge Condition: stable    Disposition: Home or Self Care    Indwelling Lines/Drains at time of discharge: none    Tests pending at the time of discharge: none      Time spent on the discharge of the patient including review of hospital course with the patient, reviewing discharge medications and arranging follow-up care: 40 minutes.    Discharge examination Patient was seen and examined on 5/17/2018 and determined to be suitable for discharge.    Discharge plan and follow up:  Follow-up Information     Go to CURRENT PROVIDERS.    Why:  As previously planned           LABWORK .  Schedule an appointment as soon as possible for a visit in 1 week.    Why:  Repeat labwork: POTASSIUM AND MAGNESIUM                 Provider  Kandi Charles MD  Department of Hospital Medicine  [facility]

## 2019-04-15 ENCOUNTER — HISTORICAL (OUTPATIENT)
Dept: ADMINISTRATIVE | Facility: HOSPITAL | Age: 59
End: 2019-04-15

## 2019-07-17 ENCOUNTER — HISTORICAL (OUTPATIENT)
Dept: RADIOLOGY | Facility: HOSPITAL | Age: 59
End: 2019-07-17

## 2019-09-05 ENCOUNTER — HISTORICAL (OUTPATIENT)
Dept: RADIOLOGY | Facility: HOSPITAL | Age: 59
End: 2019-09-05

## 2020-08-10 ENCOUNTER — HISTORICAL (OUTPATIENT)
Dept: ADMINISTRATIVE | Facility: HOSPITAL | Age: 60
End: 2020-08-10

## 2020-09-09 ENCOUNTER — HISTORICAL (OUTPATIENT)
Dept: ADMINISTRATIVE | Facility: HOSPITAL | Age: 60
End: 2020-09-09

## 2020-09-25 ENCOUNTER — HISTORICAL (OUTPATIENT)
Dept: INFUSION THERAPY | Facility: HOSPITAL | Age: 60
End: 2020-09-25

## 2020-10-07 ENCOUNTER — HISTORICAL (OUTPATIENT)
Dept: ADMINISTRATIVE | Facility: HOSPITAL | Age: 60
End: 2020-10-07

## 2021-01-20 ENCOUNTER — HISTORICAL (OUTPATIENT)
Dept: ADMINISTRATIVE | Facility: HOSPITAL | Age: 61
End: 2021-01-20

## 2021-07-19 ENCOUNTER — HISTORICAL (OUTPATIENT)
Dept: INTENSIVE CARE | Facility: HOSPITAL | Age: 61
End: 2021-07-19

## 2021-07-19 ENCOUNTER — HISTORICAL (OUTPATIENT)
Dept: RADIOLOGY | Facility: HOSPITAL | Age: 61
End: 2021-07-19

## 2021-10-11 ENCOUNTER — HISTORICAL (OUTPATIENT)
Dept: ADMINISTRATIVE | Facility: HOSPITAL | Age: 61
End: 2021-10-11

## 2022-04-09 ENCOUNTER — HISTORICAL (OUTPATIENT)
Dept: ADMINISTRATIVE | Facility: HOSPITAL | Age: 62
End: 2022-04-09
Payer: MEDICARE

## 2022-04-25 VITALS
WEIGHT: 100.31 LBS | HEIGHT: 59 IN | BODY MASS INDEX: 20.22 KG/M2 | SYSTOLIC BLOOD PRESSURE: 152 MMHG | DIASTOLIC BLOOD PRESSURE: 84 MMHG

## 2022-04-30 NOTE — DISCHARGE SUMMARY
Patient:   Radha Huang            MRN: [id number]            FIN: [id number]               Age:   57 years     Sex:  Female     :  1960   Associated Diagnoses:   Acquired gastric outlet stenosis; Anemia, iron deficiency   Author:   Angela Schaeffer MD      Discharge Information      Discharge Summary Information   Admitting physician     Angela Schaeffer MD.     Discharge diagnosis     Acquired gastric outlet stenosis ([id number] K31.1).     Anemia, iron deficiency (VAB18-BB D50.9).     Discharge medications      Physical Examination      Vital Signs (last 24 hrs)_____  Last Charted___________  Temp Oral     37.2 DegC  (:)  Heart Rate Peripheral   70 bpm  (:)  Resp Rate         20 br/min  (:)  SBP      H 189mmHg  (:)  DBP      82 mmHg  (:)  SpO2      99 %  (:)     General:  Alert and oriented, No acute distress.         Appearance: Well nourished.    Eye:  Normal conjunctiva.    Respiratory:  Lungs are clear to auscultation, Respirations are non-labored, Breath sounds are equal.    Cardiovascular:  Normal rate, Regular rhythm, No edema, surgical scars.    Gastrointestinal:  Soft, Non-tender, Non-distended, Normal bowel sounds.    Integumentary:  Warm, Pink, No pallor, No rash.    Neurologic:  Alert, Oriented, No focal deficits.    Psychiatric:  Cooperative, Appropriate mood & affect, Normal judgment.       Hospital Course   Ms. Huang is a 57 year old CF with PVD, CADs/p CABG/PCI in  on plavix, ?CKD who presented for an EGD and M2A capsule placement for iron deficiency anemia, post prandial vomiting. EGD completed yesterday.  She had a pre pyloric stenosis likely from scarring of prior ulcer disease.   This certainly could be the cause for gastric outlet obstructive symptoms/post prandial vomiting.  It was dilated with CRE balloon 12, 13.5, then 15 mm.  With the 15 mm dilation, she had significant mucosal tearing and one 4  mm area within the defect that had a mildly pulsatile vessel.  This was treated with epinephrine. One hemoclip was placed to prevent further bleeding and to close this defect.   she was monitored overnight for watch for post procedural bleeding and delayed perforation.   She did well overnight without significant pain.  She tolerated clear liquids this morning.  BM without any bleeding present.  She will be discharged today with plans for liquids today and slowly advance to soft food for 1 week tomorrow.  I instructed her to restart her plavix on Sunday.  she is to take her protonix bid for the next week.   I will have her follow-up for repeat EGD down the line.             Discharge Plan   Discharge Summary Plan   Discharge Status: improved.     Discharge disposition: discharge to home.     Prescriptions: continue same medications, reviewed with patient.

## 2022-04-30 NOTE — H&P
Patient:   Radha Huang            MRN: [id number]            FIN: [id number]               Age:   57 years     Sex:  Female     :  1960   Associated Diagnoses:   None   Author:   Maksim PEREZ, Angela FAULKNER      History of Present Illness   Ms. Huang is a 57 year old CF with PVD, CADs/p CABG/PCI in  on plavix, ?CKD here for EGD and M2A capsule placement for iron deficiency anemia, post prandial vomiting.  She was initially seen by me in January for anemia.  She has had issues with anemia for many years.  Last year she began having intermittent nausea and vomiting issues and was evaluated by GI, Dr. Fernandez, in [place].  She ahd an EGD in 2016 that showed a pylori ulcer.  Colonoscopy in 2016 was normal.  She was given protonix and repeat EGD in 2016 showed healed ulcer and findings suggestive of gastroparesis and candida esophagitis. Her nausea and vomiting did eventually resolve.  She had lost 15 lbs when she was having issues withsome lower abdominal pain. She was treated for cystitis and was able togain some weight back.     She is followed by Dr. Watts for her anemia and has had several blood transfusions and was started on iron infusions in 2016.  I set her up for a capsule endoscopy which she had done but the capsule never left the stomach.  I recommended she return for capsule placement via EGD but this was never done until now.  She did have an abdominal x ray in May that showed the capsule had finally passed on its own.    She has BMs in general every other day.  she has a history of black stools but reports none recently.  No red blood in stool.  She contacted me in October and at that time reported having vomiting with almost every meal.  she also described bad taste in her mouth.  I set her up for an EGD today and capsule placement.  Today she reports that she has not had much vomiting for the past 2 weeks. Her weight is overall stable. She  continues to take protonix daily and carafate.  She does have history of significant BC powder use but is no longer using any NsAIDs.  She takes percocet at home.  She ahs been off her plavix for 5 days.  Last hemoglobin I have from July was 9.3 g/dL.      Review of Systems   Constitutional:  Weakness.    Gastrointestinal:  Nausea, Vomiting.         Abdominal pain: Periumbilical.    Musculoskeletal:  Back pain.    Neurologic:  Alert and oriented X4.    All other systems are negative      Health Status   Allergies:    Allergies (2) Active Reaction  morphine SWELLS UP AND CAN'T BREATHE  PERSONAL HISTORY OF ALLERGY TO SWELLS UP AND CAN'T BREATHE  PENICILLIN     Current medications:  (Selected)   Inpatient Medications  Ordered  Ativan: 0.5 mg, form: Injection, IV Push, q6hr PRN for as needed for anxiety, first dose 11/21/17 11:49:00 CST, Rate of injection should not exceed 2 mg/minute, 30,022  Buffered Lidocaine 1% - 1mL syringe: 0.5 mL, 5 mg =, form: Injection, ID, As Directed PRN for other (see comment), first dose 11/21/17 9:47:00 CST, May inject 0.5mL at IV site, if not allergic  Normal Saline (0.9% NS) IV 1,000 mL: 1,000 mL, 1,000 mL, IV, 75 mL/hr, start date 11/21/17 16:24:00 CST  Normal Saline (0.9% NS) IV 1,000 mL: 1,000 mL, 1,000 mL, IV, 75 mL/hr, start date 11/21/17 16:50:00 CST  Phenergan: 12.5 mg, form: Injection, IV Push, q4hr PRN for nausea/vomiting, first dose 11/21/17 11:47:00 CST, choose only if unrelieved by Zofran or if ordered alone, 26,051  Plasmalyte 1,000 mL: 1,000 mL, 1,000 mL, IV, 20 mL/hr, start date 11/21/17 9:47:00 CST  Protonix: 40 mg, form: Injection, IV Slow, BID, first dose 11/21/17 21:00:00 CST, 24,034  Zofran: 4 mg, form: Injection, IV Push, q4hr PRN for nausea, first dose 11/21/17 11:47:00 CST, choose first if ordered with other treatments for nausea, 26,051  acetaminophen: 1,000 mg, form: Tab, Oral, q6hr PRN for pain, first dose 11/21/17 11:47:00 CST, mild/moderate, 30,022  Documented  Medications  Documented  ATORVASTATIN 20 MG TABLET: 20 mg = 1 tab(s), Oral, qPM  CARAFATE 1 GM/10 ML SUSP: Oral, qPM  Coreg 25 mg oral tablet: 25 mg = 1 tab(s), Oral, BID, 0 Refill(s)  FE C TABLET: 1 tab(s), Oral, Daily  FLUCONAZOLE 100 MG TABLET:   FOLIC ACID 1 MG TABLET: 1 mg = 1 tab(s), Oral, Daily  Imitrex 25 mg oral tablet: 25 mg = 1 tab(s), Oral, PRN migraine headache, 0 Refill(s)  Norvasc 5 mg oral tablet: 5 mg = 1 tab(s), Oral, Daily, HAS RECENTLY BACKED OFF BECAUSE PRESSURE HAS BEEN DROPPING, 0 Refill(s)  Percocet 10/325 oral tablet: 1 tab(s), Oral, q4hr, PRN pain, 0 Refill(s)  Plavix: Oral, 0 Refill(s)  Protonix 40 mg ORAL enteric coated tablet: 40 mg = 1 tab(s), Oral, Daily, # 30 tab(s), 0 Refill(s)  Xanax 0.5 mg oral tablet: 0.5 mg = 1 tab(s), Oral, As Directed, PRN for anxiety, 0 Refill(s)  Zanaflex 4 mg oral capsule: 4 mg = 1 cap(s), Oral, As Directed, 0 Refill(s)  potassium bicarbonate: 0 Refill(s)      Histories   Past Medical History:    Active  WEARS CORRECTIVE LENS ([id number])  Resolved  SINUS PROBLEMS ([id number]):  Resolved.  HIGH BLOOD PRESSURE--RECENTLY BP HAS BEEN DROPPING AND HAS BACKED OFF FROM NORVASC ([id number]):  Resolved.  ARTHRITIS ([id number]):  Resolved.  FIBROMYALGIA ([id number]):  Resolved.  MIGRAINES ([id number]):  Resolved.  ANEMIC ([id number]):  Resolved.  ANXIETY ([id number]):  Resolved.   Procedure history:    Esophagogastroduodenoscopy on 11/21/2017 at 57 Years.  Comments:  11/21/2017 10:Alma Rosa Bush RN  auto-populated from documented surgical case  Balloon Dilation Gastrointestional on 11/21/2017 at 57 Years.  Comments:  11/21/2017 10:58 - Jason RN, Alma Rosa B.  auto-populated from documented surgical case  Bleeder Control Gastrointestinal on 11/21/2017 at 57 Years.  Comments:  11/21/2017 10:58 - Alma Rosa Gomez RN.  auto-populated from documented surgical case  M2A Capsule Endoscopy on 1/23/2017 at 56 Years.  Comments:  1/23/2017 07:35 - Michael IYER, Graeme ROMERO  auto-populated from  documented surgical case  CATARACT SURGERY--BILATERAL.  INGUINAL HERNIA REPAIR.  2 C-SECTIONS.  PARTIAL HYSTERECTOMY.  BILATERAL CARPEL TUNNEL RELEASE.  APPENDECTOMY.  CABG - Coronary artery bypass graft ([id number]).  CABG x 1 - Coronary artery bypass graft x 1 ([id number]).  Esophagogastroduodenoscopy ([id number]).  Colonoscopy ([id number]).   Social History        Social & Psychosocial Habits    Alcohol  08/01/2013 Risk Assessment: Denies Alcohol Use    07/27/2016  Use: Never    Employment/School  07/27/2016  Status: DISABLED    Home/Environment  07/27/2016  Lives with: Spouse    Living situation: Home/Independent    Substance Abuse  08/01/2013 Risk Assessment: Denies Substance Abuse    07/27/2016  Use: Never    Tobacco  08/01/2013 Risk Assessment: Denies Tobacco Use    07/27/2016  Use: Never smoker  .        Physical Examination      Vital Signs (last 24 hrs)_____  Last Charted___________  Temp Oral     36.6 DegC  (NOV 21 20:07)  Heart Rate Peripheral   78 bpm  (NOV 21 20:07)  Resp Rate         22 br/min  (NOV 21 20:07)  SBP      H 147mmHg  (NOV 21 20:07)  DBP      90 mmHg  (NOV 21 20:07)  SpO2      98 %  (NOV 21 20:07)     General:  Alert and oriented, No acute distress, thin but well appearing.    Eye:  Normal conjunctiva.    Respiratory:  Lungs are clear to auscultation, Respirations are non-labored, Breath sounds are equal.    Cardiovascular:  Normal rate, Regular rhythm, No murmur, No edema, chest midline scar.    Gastrointestinal:  Soft, Non-tender, Non-distended, Normal bowel sounds.    Integumentary:  Warm, Pink, No pallor, No rash.    Neurologic:  Alert, Oriented, No focal deficits.    Psychiatric:  Cooperative, Appropriate mood & affect, Normal judgment.       Impression and Plan   Ms. Huang is a 57 year old CF with PVD, CADs/p CABG/PCI in 2014 on plavix, ?CKD here for EGD and M2A capsule placement for iron deficiency anemia, post prandial vomiting.  Will proceed with EGD as scheduled.

## 2023-05-01 ENCOUNTER — HOSPITAL ENCOUNTER (OUTPATIENT)
Dept: RADIOLOGY | Facility: CLINIC | Age: 63
Discharge: HOME | End: 2023-05-01
Attending: SPECIALIST
Payer: COMMERCIAL

## 2023-05-01 ENCOUNTER — OFFICE VISIT (OUTPATIENT)
Dept: ORTHOPEDICS | Facility: CLINIC | Age: 63
End: 2023-05-01
Payer: COMMERCIAL

## 2023-05-01 VITALS
WEIGHT: 89 LBS | DIASTOLIC BLOOD PRESSURE: 91 MMHG | SYSTOLIC BLOOD PRESSURE: 153 MMHG | BODY MASS INDEX: 18.68 KG/M2 | HEIGHT: 58 IN | HEART RATE: 76 BPM

## 2023-05-01 DIAGNOSIS — M25.512 LEFT SHOULDER PAIN, UNSPECIFIED CHRONICITY: ICD-10-CM

## 2023-05-01 DIAGNOSIS — M75.82 ROTATOR CUFF TENDONITIS, LEFT: Primary | ICD-10-CM

## 2023-05-01 PROCEDURE — 99213 PR OFFICE/OUTPT VISIT, EST, LEVL III, 20-29 MIN: ICD-10-PCS | Mod: 25,,, | Performed by: SPECIALIST

## 2023-05-01 PROCEDURE — 73030 XR SHOULDER COMPLETE 2 OR MORE VIEWS LEFT: ICD-10-PCS | Mod: LT,,, | Performed by: SPECIALIST

## 2023-05-01 PROCEDURE — 20610 DRAIN/INJ JOINT/BURSA W/O US: CPT | Mod: LT,,, | Performed by: SPECIALIST

## 2023-05-01 PROCEDURE — 20610 LARGE JOINT ASPIRATION/INJECTION: L SUBACROMIAL BURSA: ICD-10-PCS | Mod: LT,,, | Performed by: SPECIALIST

## 2023-05-01 PROCEDURE — 73030 X-RAY EXAM OF SHOULDER: CPT | Mod: LT,,, | Performed by: SPECIALIST

## 2023-05-01 PROCEDURE — 99213 OFFICE O/P EST LOW 20 MIN: CPT | Mod: 25,,, | Performed by: SPECIALIST

## 2023-05-01 RX ORDER — BETAMETHASONE SODIUM PHOSPHATE AND BETAMETHASONE ACETATE 3; 3 MG/ML; MG/ML
12 INJECTION, SUSPENSION INTRA-ARTICULAR; INTRALESIONAL; INTRAMUSCULAR; SOFT TISSUE
Status: DISCONTINUED | OUTPATIENT
Start: 2023-05-01 | End: 2023-05-01 | Stop reason: HOSPADM

## 2023-05-01 RX ADMIN — BETAMETHASONE SODIUM PHOSPHATE AND BETAMETHASONE ACETATE 12 MG: 3; 3 INJECTION, SUSPENSION INTRA-ARTICULAR; INTRALESIONAL; INTRAMUSCULAR; SOFT TISSUE at 10:05

## 2023-05-01 NOTE — PROCEDURES
Large Joint Aspiration/Injection: L subacromial bursa    Date/Time: 5/1/2023 10:15 AM  Performed by: Ady Aceves MD  Authorized by: Ady Aceves MD     Consent Done?:  Yes (Verbal)  Indications:  Arthritis  Timeout: prior to procedure the correct patient, procedure, and site was verified    Prep: patient was prepped and draped in usual sterile fashion      Local anesthesia used?: Yes    Local anesthetic:  Lidocaine 2% without epinephrine    Details:  Needle Size:  25 G  Ultrasonic Guidance for needle placement?: No    Location:  Shoulder  Site:  L subacromial bursa  Medications:  12 mg betamethasone acetate-betamethasone sodium phosphate 6 mg/mL  Patient tolerance:  Patient tolerated the procedure well with no immediate complications

## 2023-05-01 NOTE — PROGRESS NOTES
Past Medical History:   Diagnosis Date    Encounter for blood transfusion     Hypertension     Migraine headache     PONV (postoperative nausea and vomiting)     Stroke     Transfusion reaction        Past Surgical History:   Procedure Laterality Date    APPENDECTOMY      CARDIAC SURGERY      HERNIA REPAIR      HYSTERECTOMY      JOINT REPLACEMENT      VASCULAR SURGERY         Current Outpatient Medications   Medication Sig    ALPRAZolam (XANAX) 0.5 MG tablet Take 0.5 mg by mouth 2 (two) times daily as needed for Anxiety.    amLODIPine (NORVASC) 5 MG tablet Take 5 mg by mouth once daily.    aspirin 325 MG tablet Take 325 mg by mouth once daily.    atorvastatin (LIPITOR) 20 MG tablet Take 20 mg by mouth once daily.    carvedilol (COREG) 25 MG tablet Take 25 mg by mouth 2 (two) times daily.    clopidogrel (PLAVIX) 75 mg tablet Take 75 mg by mouth once daily.    food supplemt, lactose-reduced (BOOST BREEZE NUTRITIONAL) 0.04-1.05 gram-kcal/mL Liqd Take 1 Box by mouth 3 (three) times daily.    magnesium oxide (MAG-OX) 400 mg tablet Take 1 tablet (400 mg total) by mouth 2 (two) times daily.    nitroGLYCERIN (NITROSTAT) 0.3 MG SL tablet Place 0.3 mg under the tongue every 5 (five) minutes as needed for Chest pain.    oxyCODONE-acetaminophen (PERCOCET)  mg per tablet Take 1 tablet by mouth every 4 (four) hours as needed for Pain.    potassium chloride (MICRO-K) 10 MEQ CpSR Take 1 capsule (10 mEq total) by mouth once daily.    sumatriptan (IMITREX) 100 MG tablet Take 100 mg by mouth every 2 (two) hours as needed for Migraine.     No current facility-administered medications for this visit.       Review of patient's allergies indicates:   Allergen Reactions    Morphine Anaphylaxis    Penicillins Shortness Of Breath and Swelling       Family History   Problem Relation Age of Onset    Hypokalemia Mother        Social History     Socioeconomic History    Marital status:    Tobacco Use    Smoking status: Never  "  Substance and Sexual Activity    Alcohol use: No    Drug use: No       Chief Complaint:   Chief Complaint   Patient presents with    Left Shoulder - Pain     Left shoulder pain. Pt fell a couple months ago and shoulder has gotten worse. Pain radiates down arm. Can only lift arm [facility] without being in severe pain. Pain wakes her up at night. Has swelling and knots at times.       Consulting Physician: No ref. provider found    History of present illness:    This is a 62 y.o. year old female who complains of left shoulder pain when she lifts her shoulder overhead.  She fell on her left shoulder 2 months ago.  She has had slight improvement.  No therapy or injection has been performed.  No numbness or tingling.  No neck pain.    Review of Systems:    Constitution:   Denies chills, fever, and sweats.  HENT:   Denies headaches or blurry vision.  Cardiovascular:  Denies chest pain or irregular heart beat.  Respiratory:   Denies cough or shortness of breath.  Gastrointestinal:  Denies abdominal pain, nausea, or vomiting.  Musculoskeletal:   Denies muscle cramps.  Neurological:   Denies dizziness or focal weakness.  Psychiatric/Behavior: Normal mental status.  Hematology/Lymph:  Denies bleeding problem or easy bruising/bleeding.  Skin:    Denies rash or suspicious lesions.    Examination:    Vital Signs:    Vitals:    05/01/23 1044   BP: (!) 153/91   Pulse: 76   Weight: 40.4 kg (89 lb)   Height: 4' 10" (1.473 m)       Body mass index is 18.6 kg/m².    Constitution:   Well-developed, well nourished patient in no acute distress.  Neurological:   Alert and oriented x 3 and cooperative to examination.     Psychiatric/Behavior: Normal mental status.  Respiratory:   No shortness of breath.non labored breathing.  Cardiovascular: Regular rate and rhythm  Eyes:    Extraoccular muscles intact  Skin:    No scars, rash or suspicious lesions.    Physical Exam:  Left shoulder exam:   No swelling, no atrophy, no redness, no increased " heat   Positive Neer's and Jordan impingement sign   Mild weakness of the supraspinatus on strength testing  2+ pulses with intact sensory and motor function   Tenderness over the bicipital groove  Palpable AC joint superior osteophyte but no AC joint tenderness    Imaging: X-rays ordered and images interpreted today personally by me of four views of the left shoulder which show no glenohumeral evidence of cartilage space narrowing.  No fracture or dislocation.  AC joint arthritic change noted.  Impression:  As above         Assessment: Rotator cuff tendonitis, left  -     Ambulatory referral/consult to Physical/Occupational Therapy; Future; Expected date: 05/08/2023    Left shoulder pain, unspecified chronicity  -     X-Ray Shoulder 2 or More Views Left; Future; Expected date: 05/01/2023        Plan:  After verbal consent and a sterile prep the left shoulder was injected with 2 cc of corticosteroid and 2 cc of lidocaine.  Patient tolerated procedure well with no complications.  Physical therapy ordered.  We will follow up with the patient with a virtual visit in 6 weeks.  If patient has not shown clinical improvement then we will order an MRI.      DISCLAIMER: This note may have been dictated using voice recognition software and may contain grammatical errors.     NOTE: Consult report sent to referring provider via [employer].

## 2023-06-12 ENCOUNTER — OFFICE VISIT (OUTPATIENT)
Dept: ORTHOPEDICS | Facility: CLINIC | Age: 63
End: 2023-06-12
Payer: COMMERCIAL

## 2023-06-12 DIAGNOSIS — M75.82 ROTATOR CUFF TENDONITIS, LEFT: Primary | ICD-10-CM

## 2023-06-12 PROCEDURE — 99213 PR OFFICE/OUTPT VISIT, EST, LEVL III, 20-29 MIN: ICD-10-PCS | Mod: 95,,, | Performed by: SPECIALIST

## 2023-06-12 PROCEDURE — 99213 OFFICE O/P EST LOW 20 MIN: CPT | Mod: 95,,, | Performed by: SPECIALIST

## 2023-06-12 NOTE — PROGRESS NOTES
This is a telemedicine note. Patient was treated using telemedicine, real-time audio , according to [facility] protocols. Ady DAVIS MD conducted the visit from    location identified below. The patient participated in the visit at a non [facility] location selected by the patient(or patient's representative), identified below. I am licensed in the state where the patient stated they are located. The patient(or patient's representative) stated that they understood and accepted the privacy and security risks to the information at their location. Ady DAVIS MD was located at [facility].   I spoke with the patient today while she was at home.  She has been doing physical therapy and home exercises.  Her pain in the left shoulder has nearly completely resolved.  The corticosteroid injection helped relieve the pain and she has regained her strength and her motion.  We will not need to follow up with MRI of the left shoulder.  She will follow up with me p.r.n..

## 2024-11-04 ENCOUNTER — HOSPITAL ENCOUNTER (OUTPATIENT)
Dept: RADIOLOGY | Facility: CLINIC | Age: 64
Discharge: HOME | End: 2024-11-04
Attending: PHYSICIAN ASSISTANT
Payer: MEDICARE

## 2024-11-04 ENCOUNTER — OFFICE VISIT (OUTPATIENT)
Dept: ORTHOPEDICS | Facility: CLINIC | Age: 64
End: 2024-11-04
Payer: MEDICARE

## 2024-11-04 VITALS
WEIGHT: 85 LBS | HEIGHT: 58 IN | BODY MASS INDEX: 17.84 KG/M2 | HEART RATE: 86 BPM | DIASTOLIC BLOOD PRESSURE: 85 MMHG | SYSTOLIC BLOOD PRESSURE: 163 MMHG

## 2024-11-04 DIAGNOSIS — M75.81 TENDONITIS OF BOTH ROTATOR CUFFS: Primary | ICD-10-CM

## 2024-11-04 DIAGNOSIS — M25.511 BILATERAL SHOULDER PAIN, UNSPECIFIED CHRONICITY: ICD-10-CM

## 2024-11-04 DIAGNOSIS — M25.512 BILATERAL SHOULDER PAIN, UNSPECIFIED CHRONICITY: ICD-10-CM

## 2024-11-04 DIAGNOSIS — M75.82 TENDONITIS OF BOTH ROTATOR CUFFS: Primary | ICD-10-CM

## 2024-11-04 PROCEDURE — 20610 DRAIN/INJ JOINT/BURSA W/O US: CPT | Mod: 50,,, | Performed by: PHYSICIAN ASSISTANT

## 2024-11-04 PROCEDURE — 99214 OFFICE O/P EST MOD 30 MIN: CPT | Mod: 25,,, | Performed by: PHYSICIAN ASSISTANT

## 2024-11-04 PROCEDURE — 73030 X-RAY EXAM OF SHOULDER: CPT | Mod: 50,,, | Performed by: PHYSICIAN ASSISTANT

## 2024-11-04 RX ORDER — UBROGEPANT 100 MG/1
TABLET ORAL
COMMUNITY

## 2024-11-04 RX ORDER — BETAMETHASONE SODIUM PHOSPHATE AND BETAMETHASONE ACETATE 3; 3 MG/ML; MG/ML
12 INJECTION, SUSPENSION INTRA-ARTICULAR; INTRALESIONAL; INTRAMUSCULAR; SOFT TISSUE
Status: DISCONTINUED | OUTPATIENT
Start: 2024-11-04 | End: 2024-11-04 | Stop reason: HOSPADM

## 2024-11-04 RX ORDER — LIDOCAINE HYDROCHLORIDE 20 MG/ML
5 INJECTION, SOLUTION EPIDURAL; INFILTRATION; INTRACAUDAL; PERINEURAL
Status: DISCONTINUED | OUTPATIENT
Start: 2024-11-04 | End: 2024-11-04 | Stop reason: HOSPADM

## 2024-11-04 RX ORDER — HYDROXYCHLOROQUINE SULFATE 200 MG/1
200 TABLET, FILM COATED ORAL
COMMUNITY

## 2024-11-04 RX ADMIN — BETAMETHASONE SODIUM PHOSPHATE AND BETAMETHASONE ACETATE 12 MG: 3; 3 INJECTION, SUSPENSION INTRA-ARTICULAR; INTRALESIONAL; INTRAMUSCULAR; SOFT TISSUE at 02:11

## 2024-11-04 RX ADMIN — LIDOCAINE HYDROCHLORIDE 5 ML: 20 INJECTION, SOLUTION EPIDURAL; INFILTRATION; INTRACAUDAL; PERINEURAL at 02:11

## 2024-11-04 NOTE — PROGRESS NOTES
Past Medical History:   Diagnosis Date    Encounter for blood transfusion     Hypertension     Migraine headache     PONV (postoperative nausea and vomiting)     Stroke     Transfusion reaction        Past Surgical History:   Procedure Laterality Date    APPENDECTOMY      CARDIAC SURGERY      HERNIA REPAIR      HYSTERECTOMY      JOINT REPLACEMENT      VASCULAR SURGERY         Current Outpatient Medications   Medication Sig    ALPRAZolam (XANAX) 0.5 MG tablet Take 0.5 mg by mouth 2 (two) times daily as needed for Anxiety.    amLODIPine (NORVASC) 5 MG tablet Take 5 mg by mouth once daily.    aspirin 325 MG tablet Take 325 mg by mouth once daily.    atorvastatin (LIPITOR) 20 MG tablet Take 20 mg by mouth once daily.    carvedilol (COREG) 25 MG tablet Take 25 mg by mouth 2 (two) times daily.    clopidogrel (PLAVIX) 75 mg tablet Take 75 mg by mouth once daily.    nitroGLYCERIN (NITROSTAT) 0.3 MG SL tablet Place 0.3 mg under the tongue every 5 (five) minutes as needed for Chest pain.    oxyCODONE-acetaminophen (PERCOCET)  mg per tablet Take 1 tablet by mouth every 4 (four) hours as needed for Pain.    potassium chloride (MICRO-K) 10 MEQ CpSR Take 1 capsule (10 mEq total) by mouth once daily.    food supplemt, lactose-reduced (BOOST BREEZE NUTRITIONAL) 0.04-1.05 gram-kcal/mL Liqd Take 1 Box by mouth 3 (three) times daily. (Patient not taking: Reported on 11/4/2024)    hydroxychloroquine (PLAQUENIL) 200 mg tablet Take 200 mg by mouth.    magnesium oxide (MAG-OX) 400 mg tablet Take 1 tablet (400 mg total) by mouth 2 (two) times daily. (Patient not taking: Reported on 11/4/2024)    sumatriptan (IMITREX) 100 MG tablet Take 100 mg by mouth every 2 (two) hours as needed for Migraine. (Patient not taking: Reported on 11/4/2024)    UBRELVY 100 mg tablet TAKE ONE TABLET BY MOUTH AT ONSET OF MIGRAINE. IF SYMPTOMS PERSIST, A SECOND DOSE MAY BE TAKEN IN 2 HOURS. DO NOT EXCEED 2 DOSES IN A 24 HOUR PERIOD, UNLESS OTHERWISE  INSTRUCTED BY YOUR PHYSICIAN     No current facility-administered medications for this visit.       Review of patient's allergies indicates:   Allergen Reactions    Morphine Anaphylaxis    Penicillins Shortness Of Breath and Swelling       Family History   Problem Relation Name Age of Onset    Hypokalemia Mother         Social History     Socioeconomic History    Marital status:    Tobacco Use    Smoking status: Never   Substance and Sexual Activity    Alcohol use: No    Drug use: No    Sexual activity: Yes     Partners: Male       Chief Complaint:   Chief Complaint   Patient presents with    Right Shoulder - Pain     B/L shoulder pain--Pain is constant and very severe. Pt states she can't sleep at night or lift light weights. Pt tried injection on the Lt shoulder few years ago and states it did help.        Consulting Physician: No ref. provider found    History of present illness:    This is a 64 y.o. year old female who presents today for evaluation for bilateral shoulder pain, left-greater-than-right.  She does have a history of rotator cuff tendinitis.  This has been treated in the past with corticosteroid injection and some physical therapy.  She had symptomatic improvement.  She is complaining of gradually worsening shoulder pain has been present for the last couple months.  Ultimately he has been present for years but this new episode has been there for couple months.  She has difficulty with raising her arms at times.  Pain with lifting.  Review of Systems:    Constitution:   Denies chills, fever, and sweats.  HENT:   Denies headaches or blurry vision.  Cardiovascular:  Denies chest pain or irregular heart beat.  Respiratory:   Denies cough or shortness of breath.  Gastrointestinal:  Denies abdominal pain, nausea, or vomiting.  Musculoskeletal:   Denies muscle cramps.  Neurological:   Denies dizziness or focal weakness.  Psychiatric/Behavior: Normal mental status.  Hematology/Lymph:  Denies bleeding  "problem or easy bruising/bleeding.  Skin:    Denies rash or suspicious lesions.    Examination:    Vital Signs:    Vitals:    11/04/24 1453 11/04/24 1457   BP: (S) (!) 163/85    Pulse: 86    Weight: 38.6 kg (85 lb)    Height: 4' 10" (1.473 m)    PainSc:  10-Worst pain ever       Body mass index is 17.77 kg/m².    Constitution:   Well-developed, well nourished patient in no acute distress.  Neurological:   Alert and oriented x 3 and cooperative to examination.     Psychiatric/Behavior: Normal mental status.  Respiratory:   No shortness of breath.non labored breathing.  Cardiovascular: Regular rate and rhythm  Eyes:    Extraoccular muscles intact  Skin:    No scars, rash or suspicious lesions.    Physical Exam:  Left and right shoulder exam:   No swelling, no atrophy, no redness, no increased heat   Positive Neer's and Jordan impingement sign   Mild weakness of the supraspinatus on strength testing  2+ pulses with intact sensory and motor function   Tenderness over the bicipital groove  Palpable AC joint superior osteophyte but no AC joint tenderness    Imaging: X-rays ordered and images interpreted today personally by me of four views of the left and right shoulder which show no glenohumeral evidence of cartilage space narrowing.  No fracture or dislocation.  AC joint arthritic change noted.  Impression:  As above         Assessment: Tendonitis of both rotator cuffs  -     X-Ray Shoulder 2 or more views Bilat; Future; Expected date: 11/04/2024  -     Large Joint Aspiration/Injection: bilateral subacromial bursa  -     Ambulatory referral/consult to Physical/Occupational Therapy; Future; Expected date: 11/11/2024        Plan:  After verbal consent and a sterile prep the left and right shoulder was injected with 2 cc of corticosteroid and 2 cc of lidocaine.  Patient tolerated procedure well with no complications.  Physical therapy ordered.  We will follow up with the patient in 6 weeks.  If patient has not shown " clinical improvement then we will order an MRI of 1 or both shoulders.      DISCLAIMER: This note may have been dictated using voice recognition software and may contain grammatical errors.     NOTE: Consult report sent to referring provider via [employer].

## 2024-11-04 NOTE — PROCEDURES
Large Joint Aspiration/Injection: bilateral subacromial bursa    Date/Time: 11/4/2024 2:45 PM    Performed by: Nasir Thompson PA  Authorized by: Nasir Thompson PA    Consent Done?:  Yes (Verbal)  Indications:  Pain  Site marked: the procedure site was marked    Timeout: prior to procedure the correct patient, procedure, and site was verified    Prep: patient was prepped and draped in usual sterile fashion    Approach:  Posterior  Location:  Shoulder  Laterality:  Bilateral  Site:  Bilateral subacromial bursa  Medications (Right):  5 mL LIDOcaine (PF) 20 mg/mL (2%) 20 mg/mL (2 %); 12 mg betamethasone acetate-betamethasone sodium phosphate 6 mg/mL  Medications (Left):  5 mL LIDOcaine (PF) 20 mg/mL (2%) 20 mg/mL (2 %); 12 mg betamethasone acetate-betamethasone sodium phosphate 6 mg/mL  Patient tolerance:  Patient tolerated the procedure well with no immediate complications

## 2024-12-16 ENCOUNTER — OFFICE VISIT (OUTPATIENT)
Dept: ORTHOPEDICS | Facility: CLINIC | Age: 64
End: 2024-12-16
Payer: MEDICARE

## 2024-12-16 VITALS — BODY MASS INDEX: 17.59 KG/M2 | HEIGHT: 58 IN | WEIGHT: 83.81 LBS

## 2024-12-16 DIAGNOSIS — M75.81 TENDONITIS OF BOTH ROTATOR CUFFS: Primary | ICD-10-CM

## 2024-12-16 DIAGNOSIS — M75.82 TENDONITIS OF BOTH ROTATOR CUFFS: Primary | ICD-10-CM

## 2024-12-16 RX ORDER — BETAMETHASONE SODIUM PHOSPHATE AND BETAMETHASONE ACETATE 3; 3 MG/ML; MG/ML
12 INJECTION, SUSPENSION INTRA-ARTICULAR; INTRALESIONAL; INTRAMUSCULAR; SOFT TISSUE
Status: DISCONTINUED | OUTPATIENT
Start: 2024-12-16 | End: 2024-12-16 | Stop reason: HOSPADM

## 2024-12-16 RX ADMIN — BETAMETHASONE SODIUM PHOSPHATE AND BETAMETHASONE ACETATE 12 MG: 3; 3 INJECTION, SUSPENSION INTRA-ARTICULAR; INTRALESIONAL; INTRAMUSCULAR; SOFT TISSUE at 01:12

## 2024-12-16 NOTE — PROGRESS NOTES
Past Medical History:   Diagnosis Date    Encounter for blood transfusion     Hypertension     Migraine headache     PONV (postoperative nausea and vomiting)     Stroke     Transfusion reaction        Past Surgical History:   Procedure Laterality Date    APPENDECTOMY      CARDIAC SURGERY      HERNIA REPAIR      HYSTERECTOMY      JOINT REPLACEMENT      VASCULAR SURGERY         Current Outpatient Medications   Medication Sig    ALPRAZolam (XANAX) 0.5 MG tablet Take 0.5 mg by mouth 2 (two) times daily as needed for Anxiety.    amLODIPine (NORVASC) 5 MG tablet Take 5 mg by mouth once daily.    aspirin 325 MG tablet Take 325 mg by mouth once daily.    atorvastatin (LIPITOR) 20 MG tablet Take 20 mg by mouth once daily.    carvedilol (COREG) 25 MG tablet Take 25 mg by mouth 2 (two) times daily.    clopidogrel (PLAVIX) 75 mg tablet Take 75 mg by mouth once daily.    hydroxychloroquine (PLAQUENIL) 200 mg tablet Take 200 mg by mouth.    nitroGLYCERIN (NITROSTAT) 0.3 MG SL tablet Place 0.3 mg under the tongue every 5 (five) minutes as needed for Chest pain.    oxyCODONE-acetaminophen (PERCOCET)  mg per tablet Take 1 tablet by mouth every 4 (four) hours as needed for Pain.    potassium chloride (MICRO-K) 10 MEQ CpSR Take 1 capsule (10 mEq total) by mouth once daily.    UBRELVY 100 mg tablet TAKE ONE TABLET BY MOUTH AT ONSET OF MIGRAINE. IF SYMPTOMS PERSIST, A SECOND DOSE MAY BE TAKEN IN 2 HOURS. DO NOT EXCEED 2 DOSES IN A 24 HOUR PERIOD, UNLESS OTHERWISE INSTRUCTED BY YOUR PHYSICIAN    food supplemt, lactose-reduced (BOOST BREEZE NUTRITIONAL) 0.04-1.05 gram-kcal/mL Liqd Take 1 Box by mouth 3 (three) times daily. (Patient not taking: Reported on 12/16/2024)    magnesium oxide (MAG-OX) 400 mg tablet Take 1 tablet (400 mg total) by mouth 2 (two) times daily. (Patient not taking: Reported on 12/16/2024)    sumatriptan (IMITREX) 100 MG tablet Take 100 mg by mouth every 2 (two) hours as needed for Migraine. (Patient not taking:  "Reported on 12/16/2024)     No current facility-administered medications for this visit.       Review of patient's allergies indicates:   Allergen Reactions    Morphine Anaphylaxis    Penicillins Shortness Of Breath and Swelling       Family History   Problem Relation Name Age of Onset    Hypokalemia Mother         Social History     Socioeconomic History    Marital status:    Tobacco Use    Smoking status: Never   Substance and Sexual Activity    Alcohol use: No    Drug use: No    Sexual activity: Yes     Partners: Male       Chief Complaint:   Chief Complaint   Patient presents with    Shoulder Pain     Pt states she have noticed an improvement since last visit, but yesterday she had an episode of severe pain. Pain aggravates depending of what she is doing. Can't raise or rotate the arms.        Consulting Physician: No ref. provider found    History of present illness:    This is a 64 y.o. year old female who complains of some improvement in pain after corticosteroid injections in both shoulders 6 weeks ago secondary to rotator cuff tendinitis.  She still has pain in the left shoulder.  No weakness.  Overhead activity is the worst.    Review of Systems:    Constitution:   Denies chills, fever, and sweats.  HENT:   Denies headaches or blurry vision.  Cardiovascular:  Denies chest pain or irregular heart beat.  Respiratory:   Denies cough or shortness of breath.  Gastrointestinal:  Denies abdominal pain, nausea, or vomiting.  Musculoskeletal:   Denies muscle cramps.  Neurological:   Denies dizziness or focal weakness.  Psychiatric/Behavior: Normal mental status.  Hematology/Lymph:  Denies bleeding problem or easy bruising/bleeding.  Skin:    Denies rash or suspicious lesions.    Examination:    Vital Signs:    Vitals:    12/16/24 1308 12/16/24 1311   BP: (!) (P) 143/84    Pulse: (P) 84    Weight: 38 kg (83 lb 12.8 oz)    Height: 4' 10" (1.473 m)    PainSc:    6       Body mass index is 17.51 " kg/m².    Constitution:   Well-developed, well nourished patient in no acute distress.  Neurological:   Alert and oriented x 3 and cooperative to examination.     Psychiatric/Behavior: Normal mental status.  Respiratory:   No shortness of breath.non labored breathing.  Cardiovascular: Regular rate and rhythm  Eyes:    Extraoccular muscles intact  Skin:    No scars, rash or suspicious lesions.    Physical Exam:  Right and left shoulder exam:   No swelling, no redness, no atrophy, no increased heat   2+ radial pulses   Intact sensory and motor function   No rotator cuff weakness on strength assessment   No deltoid weakness   No pathologic laxity   Negative impingement signs right shoulder   Positive Neer's and Jordan impingement sign left shoulder            Assessment: Tendonitis of both rotator cuffs  -     Ambulatory referral/consult to Physical/Occupational Therapy; Future; Expected date: 12/23/2024        Plan:  After verbal consent and sterile prep the left shoulder was injected in the subacromial space with corticosteroid and lidocaine.  Patient tolerated procedure well no complications.    Physical therapy for bilateral shoulder rotator cuff tendinitis and follow-up by telemedicine in 6 visits to see how she is doing.      DISCLAIMER: This note may have been dictated using voice recognition software and may contain grammatical errors.     NOTE: Consult report sent to referring provider via [employer].

## 2024-12-16 NOTE — PROCEDURES
Large Joint Aspiration/Injection: L subacromial bursa    Date/Time: 12/16/2024 1:15 PM    Performed by: Ady Aceves MD  Authorized by: Ady Aceves MD    Consent Done?:  Yes (Verbal)  Indications:  Arthritis  Timeout: prior to procedure the correct patient, procedure, and site was verified    Prep: patient was prepped and draped in usual sterile fashion      Local anesthesia used?: Yes    Local anesthetic:  Lidocaine 2% without epinephrine    Details:  Needle Size:  25 G  Ultrasonic Guidance for needle placement?: No    Location:  Shoulder  Site:  L subacromial bursa  Medications:  12 mg betamethasone acetate-betamethasone sodium phosphate 6 mg/mL  Patient tolerance:  Patient tolerated the procedure well with no immediate complications

## 2025-01-27 ENCOUNTER — OFFICE VISIT (OUTPATIENT)
Dept: ORTHOPEDICS | Facility: CLINIC | Age: 65
End: 2025-01-27
Payer: MEDICARE

## 2025-01-27 DIAGNOSIS — M75.81 TENDONITIS OF BOTH ROTATOR CUFFS: Primary | ICD-10-CM

## 2025-01-27 DIAGNOSIS — M75.82 TENDONITIS OF BOTH ROTATOR CUFFS: Primary | ICD-10-CM

## 2025-01-27 PROCEDURE — 98016 BRIEF COMUNICAJ TECH-BSD SVC: CPT | Mod: 95,,, | Performed by: SPECIALIST

## 2025-01-27 NOTE — PROGRESS NOTES
Past Medical History:   Diagnosis Date    Encounter for blood transfusion     Hypertension     Migraine headache     PONV (postoperative nausea and vomiting)     Stroke     Transfusion reaction        Past Surgical History:   Procedure Laterality Date    ADENOIDECTOMY      APPENDECTOMY      CARDIAC SURGERY       SECTION      EYE SURGERY      HERNIA REPAIR      HYSTERECTOMY      JOINT REPLACEMENT      SPINE SURGERY      VASCULAR SURGERY         Current Outpatient Medications   Medication Sig    ALPRAZolam (XANAX) 0.5 MG tablet Take 0.5 mg by mouth 2 (two) times daily as needed for Anxiety.    amLODIPine (NORVASC) 5 MG tablet Take 5 mg by mouth once daily.    aspirin 325 MG tablet Take 325 mg by mouth once daily.    atorvastatin (LIPITOR) 20 MG tablet Take 20 mg by mouth once daily.    carvedilol (COREG) 25 MG tablet Take 25 mg by mouth 2 (two) times daily.    clopidogrel (PLAVIX) 75 mg tablet Take 75 mg by mouth once daily.    food supplemt, lactose-reduced (BOOST BREEZE NUTRITIONAL) 0.04-1.05 gram-kcal/mL Liqd Take 1 Box by mouth 3 (three) times daily. (Patient not taking: Reported on 2024)    hydroxychloroquine (PLAQUENIL) 200 mg tablet Take 200 mg by mouth.    magnesium oxide (MAG-OX) 400 mg tablet Take 1 tablet (400 mg total) by mouth 2 (two) times daily. (Patient not taking: Reported on 2024)    nitroGLYCERIN (NITROSTAT) 0.3 MG SL tablet Place 0.3 mg under the tongue every 5 (five) minutes as needed for Chest pain.    oxyCODONE-acetaminophen (PERCOCET)  mg per tablet Take 1 tablet by mouth every 4 (four) hours as needed for Pain.    potassium chloride (MICRO-K) 10 MEQ CpSR Take 1 capsule (10 mEq total) by mouth once daily.    sumatriptan (IMITREX) 100 MG tablet Take 100 mg by mouth every 2 (two) hours as needed for Migraine. (Patient not taking: Reported on 2024)    UBRELVY 100 mg tablet TAKE ONE TABLET BY MOUTH AT ONSET OF MIGRAINE. IF SYMPTOMS PERSIST, A SECOND DOSE MAY BE TAKEN  IN 2 HOURS. DO NOT EXCEED 2 DOSES IN A 24 HOUR PERIOD, UNLESS OTHERWISE INSTRUCTED BY YOUR PHYSICIAN     No current facility-administered medications for this visit.       Review of patient's allergies indicates:   Allergen Reactions    Morphine Anaphylaxis    Penicillins Shortness Of Breath and Swelling       Family History   Problem Relation Name Age of Onset    Hypokalemia Mother         Social History     Socioeconomic History    Marital status:    Tobacco Use    Smoking status: Never   Substance and Sexual Activity    Alcohol use: No    Drug use: No    Sexual activity: Yes     Partners: Female       Chief Complaint:   Chief Complaint   Patient presents with    Follow-up     6wk f/u bilateral shoulder PT & cortisone inj 12/16/24       Consulting Physician: No ref. provider found    History of present illness:    This is a 64 y.o. year old female who complains of continue bilateral shoulder pain but it has improved slightly since she began physical therapy and since her last corticosteroid injection.  Her mother is 89 years old and she and her sister have been responsible for taking care of her that has involved lot of lifting and pushing and pulling to help position her in bed and get her into the wheelchair and chair.  They think she is going to be placed in a nursing home later this week and Ms. Huang would restart her physical therapy after placement.  No numbness or tingling.  She has full motion in both shoulders.    Review of Systems:    Constitution:   Denies chills, fever, and sweats.  HENT:   Denies headaches or blurry vision.  Cardiovascular:  Denies chest pain or irregular heart beat.  Respiratory:   Denies cough or shortness of breath.  Gastrointestinal:  Denies abdominal pain, nausea, or vomiting.  Musculoskeletal:   Denies muscle cramps.  Neurological:   Denies dizziness or focal weakness.  Psychiatric/Behavior: Normal mental status.  Hematology/Lymph:  Denies bleeding problem or easy  bruising/bleeding.  Skin:    Denies rash or suspicious lesions.    Examination:    Vital Signs:  There were no vitals filed for this visit.    There is no height or weight on file to calculate BMI.    Constitution:   Well-developed, well nourished patient in no acute distress.  Neurological:   Alert and oriented x 3 and cooperative to examination.     Psychiatric/Behavior: Normal mental status.  Respiratory:   No shortness of breath.non labored breathing.  Cardiovascular: Regular rate and rhythm  Eyes:    Extraoccular muscles intact  Skin:    No scars, rash or suspicious lesions.    Physical Exam:  Bilateral shoulder exam:   Full active range motion in both shoulders   She does have some pain when she has full forward elevation   Grossly neurovascularly intact            Assessment: Tendonitis of both rotator cuffs    Time spent on visit was 5 minutes  Plan:  Continue with physical therapy and follow-up by telemedicine visit in 6 weeks.    This is a telemedicine note. Patient was treated using telemedicine, real-time audio and video, according to [facility] protocols. Ady DAVIS MD conducted the visit from    location identified below. The patient participated in the visit at a non [facility] location selected by the patient(or patient's representative), identified below. I am licensed in the state where the patient stated they are located. The patient(or patient's representative) stated that they understood and accepted the privacy and security risks to the information at their location. Ady DAVIS MD was located at [facility].      DISCLAIMER: This note may have been dictated using voice recognition software and may contain grammatical errors.     NOTE: Consult report sent to referring provider via [employer] EMR.